# Patient Record
Sex: FEMALE | Race: WHITE | NOT HISPANIC OR LATINO | Employment: OTHER | URBAN - METROPOLITAN AREA
[De-identification: names, ages, dates, MRNs, and addresses within clinical notes are randomized per-mention and may not be internally consistent; named-entity substitution may affect disease eponyms.]

---

## 2014-01-24 LAB — HM PAP SMEAR: NORMAL

## 2017-02-23 ENCOUNTER — ALLSCRIPTS OFFICE VISIT (OUTPATIENT)
Dept: OTHER | Facility: OTHER | Age: 47
End: 2017-02-23

## 2017-03-28 ENCOUNTER — GENERIC CONVERSION - ENCOUNTER (OUTPATIENT)
Dept: OTHER | Facility: OTHER | Age: 47
End: 2017-03-28

## 2017-07-03 ENCOUNTER — ALLSCRIPTS OFFICE VISIT (OUTPATIENT)
Dept: OTHER | Facility: OTHER | Age: 47
End: 2017-07-03

## 2018-01-13 VITALS
RESPIRATION RATE: 14 BRPM | DIASTOLIC BLOOD PRESSURE: 62 MMHG | WEIGHT: 181.7 LBS | HEIGHT: 64 IN | SYSTOLIC BLOOD PRESSURE: 110 MMHG | BODY MASS INDEX: 31.02 KG/M2 | HEART RATE: 60 BPM

## 2018-01-13 VITALS
WEIGHT: 172 LBS | BODY MASS INDEX: 29.37 KG/M2 | SYSTOLIC BLOOD PRESSURE: 102 MMHG | DIASTOLIC BLOOD PRESSURE: 60 MMHG | HEIGHT: 64 IN

## 2018-01-15 NOTE — RESULT NOTES
Message   Please let her know that her vascular studies appear normal  Thanks  Verified Results  VAS SCREENING [id number] 09:52AM Sarah Hare     Test Name Result Flag Reference   VAS SCREENING (Report)     [facility] REPORT   CLINICAL:   VASCULAR SCREENING      Risk Factors   PT has a significant family history of CAD and PAD  The patient has no history   of obesity  and is a non-smoker  Risk Factors   Clinical   Right Pressure: 118/ mm Hg, Left Pressure: 116/ mm Hg  Right pulse examination shows healthy posterior tibial artery and healthy   dorsalis pedis artery  Left pulse examination shows healthy posterior tibial artery and healthy   dorsalis pedis artery  FINDINGS:      Unilateral        PSV TRV Diam (cm) EDV    Celiac Artery Expiration 113      2 2  25       Right    Impression             PSV EDV Pressure    Prox CCA                     103  28         DorsPedis                            120    Dist CCA                     87  24         Post Tibial                           128    Prox  ICA  1  No significant carotid disease  84  32         Dist  ICA                     99  47            Left     Impression             PSV EDV Pressure    Prox CCA                     98  28         DorsPedis                            118    Dist CCA                     69  24         Post Tibial                           128    Prox  ICA  1  No significant carotid disease  52  23         Dist  ICA                     89  40                  CONCLUSION:      Impression      CAROTID SCREENING:   Evaluation reveals a normal internal carotid artery on the right  Evaluation reveals a normal internal carotid artery on the left  AORTA SCREENING:   The abdominal aorta is patent and normal in caliber with the greatest diameter   measurement of 2 2 cm  PAD SCREENING:   The ankle/brachial index on the right is 1 08 , which is within normal limits     The ankle/brachial index on the left is 1 08 , which is within normal limits        SIGNATURE:   Electronically Signed by: Lydia Thorne MD on 2016-06-29 02:00:56 PM

## 2018-04-05 ENCOUNTER — OFFICE VISIT (OUTPATIENT)
Dept: FAMILY MEDICINE CLINIC | Facility: CLINIC | Age: 48
End: 2018-04-05
Payer: COMMERCIAL

## 2018-04-05 VITALS
DIASTOLIC BLOOD PRESSURE: 62 MMHG | TEMPERATURE: 98.3 F | SYSTOLIC BLOOD PRESSURE: 104 MMHG | WEIGHT: 179 LBS | BODY MASS INDEX: 30.44 KG/M2

## 2018-04-05 DIAGNOSIS — R22.31 AXILLARY LUMP, RIGHT: Primary | ICD-10-CM

## 2018-04-05 PROCEDURE — 1036F TOBACCO NON-USER: CPT | Performed by: FAMILY MEDICINE

## 2018-04-05 PROCEDURE — 99214 OFFICE O/P EST MOD 30 MIN: CPT | Performed by: FAMILY MEDICINE

## 2018-04-05 NOTE — PROGRESS NOTES
Assessment/Plan:  Axillary lump, right  The patient has a subcutaneous mass in her right axilla  Based on her history and examination I believe it is in all likelihood benign in nature such as lipoma or increased glandular tissue  It is not inflamed to suggest hidradenitis suppurativa  When she left we discussed continued observation in the likely benign nature of the condition  As I am finishing her note today I believe that an ultrasound to be completely sure of its nature is warranted and we will let her know this  Diagnoses and all orders for this visit:    Axillary lump, right  -     US extremity soft tissue; Future          Subjective:   Chief Complaint   Patient presents with    Mass     lump in armpit xs 3 weeks   Sees Elda Norton  A lump in armpit noted 3 weeks ago while travelling with Mom  Noted visually after taking a shower  Soft and non tender  Began to feel a little tender 2-3 days ago  No history of same  Patient ID: Caleb Mendez is a 52 y o  female  HPI  The patient is a 44-year-old female who presents today stating that she noted a lump in her right armpit approximately 3 weeks ago  She had no discomfort  She was taking shower in raise her arm to tail off and noticed that it seemed enlarged compared to her left side  It is soft mobile and nontender  She states the couple days ago it felt a little tender and she became more concerned no it is seems nontender presently  She has had some issues with her left axilla for which she has been seen gynecologist knee told her that they thought was some excess breast tissue and that it was not concerned  She has had no fevers chills or constitutional symptoms she has had no other lumps or bumps  She does get regular mammography through her gynecologist, Dr Sharath Bush in [facility]  Her mother had ovarian carcinoma and she follows regularly with her      The following portions of the patient's history were reviewed and updated as appropriate: allergies, current medications, past family history, past medical history, past social history, past surgical history and problem list     Review of Systems   Constitution: Positive for night sweats  Negative for chills, decreased appetite and fever  Night sweats have been felt to be caused by vasomotor symptoms from perimenopause   Cardiovascular: Negative for chest pain and leg swelling  Respiratory: Negative for cough and shortness of breath  Hematologic/Lymphatic: Positive for adenopathy  L axilla  R by history and told extra breast tissue  Skin: Negative for rash and suspicious lesions  Check spot on cheek, present for a long while without change  Gastrointestinal: Negative for constipation and diarrhea  Genitourinary: Positive for missed menses  Negative for bladder incontinence, menorrhagia and pelvic pain  Some menstrual irregularity over the past 6 months  Objective:    Physical Exam   Constitutional: She is oriented to person, place, and time  She appears well-developed and well-nourished  Overweight in no apparent distress   Neck: No JVD present  No thyromegaly present  Cardiovascular: Normal rate and regular rhythm  Pulmonary/Chest: Breath sounds normal    Musculoskeletal: She exhibits no edema  Lymphadenopathy:     She has no cervical adenopathy  Neurological: She is alert and oriented to person, place, and time  Skin:   Right axilla reveals a subcutaneous fullness of approximately 4 by 2-1/2 cm  It is soft mobile not fixed in place and no firmness or induration  I believe this most likely represents a lipoma or some excess glandular tissue  Does not feel like breast tail as it is not contiguous, does not feel like adenopathy

## 2018-04-05 NOTE — ASSESSMENT & PLAN NOTE
The patient has a subcutaneous mass in her right axilla  Based on her history and examination I believe it is in all likelihood benign in nature such as lipoma or increased glandular tissue  It is not inflamed to suggest hidradenitis suppurativa  When she left we discussed continued observation in the likely benign nature of the condition  As I am finishing her note today I believe that an ultrasound to be completely sure of its nature is warranted and we will let her know this

## 2018-04-11 ENCOUNTER — HOSPITAL ENCOUNTER (OUTPATIENT)
Dept: ULTRASOUND IMAGING | Facility: CLINIC | Age: 48
Discharge: HOME | End: 2018-04-11
Payer: COMMERCIAL

## 2018-04-11 DIAGNOSIS — R22.31 AXILLARY LUMP, RIGHT: ICD-10-CM

## 2018-04-11 PROCEDURE — 76882 US LMTD JT/FCL EVL NVASC XTR: CPT

## 2019-06-22 ENCOUNTER — TELEPHONE (OUTPATIENT)
Dept: OTHER | Facility: OTHER | Age: 49
End: 2019-06-22

## 2019-07-30 ENCOUNTER — OFFICE VISIT (OUTPATIENT)
Dept: FAMILY MEDICINE CLINIC | Facility: CLINIC | Age: 49
End: 2019-07-30
Payer: COMMERCIAL

## 2019-07-30 VITALS
DIASTOLIC BLOOD PRESSURE: 72 MMHG | BODY MASS INDEX: 30.22 KG/M2 | WEIGHT: 177 LBS | OXYGEN SATURATION: 98 % | SYSTOLIC BLOOD PRESSURE: 124 MMHG | HEIGHT: 64 IN | HEART RATE: 70 BPM | TEMPERATURE: 97.8 F

## 2019-07-30 DIAGNOSIS — L30.9 DERMATITIS: Primary | ICD-10-CM

## 2019-07-30 PROCEDURE — 1036F TOBACCO NON-USER: CPT | Performed by: FAMILY MEDICINE

## 2019-07-30 PROCEDURE — 99214 OFFICE O/P EST MOD 30 MIN: CPT | Performed by: FAMILY MEDICINE

## 2019-07-30 PROCEDURE — 3008F BODY MASS INDEX DOCD: CPT | Performed by: FAMILY MEDICINE

## 2019-07-30 RX ORDER — OXYCODONE HYDROCHLORIDE AND ACETAMINOPHEN 5; 325 MG/1; MG/1
1 TABLET ORAL EVERY 6 HOURS PRN
Refills: 0 | COMMUNITY
Start: 2019-07-02 | End: 2019-07-30 | Stop reason: ALTCHOICE

## 2019-07-30 RX ORDER — PREDNISONE 10 MG/1
10 TABLET ORAL DAILY
Qty: 22 TABLET | Refills: 0 | Status: SHIPPED | OUTPATIENT
Start: 2019-07-30 | End: 2020-02-20 | Stop reason: ALTCHOICE

## 2019-07-30 NOTE — PROGRESS NOTES
Assessment/Plan:  Axillary lump, right  US was negative    Dermatitis  The patient has an erythematous eruption which is intensely pruritic  She has had this repeatedly at this time of the year in the past   At times was felt to be rhus dermatitis but I do not believe this based on her examination presently  It may be examined this due to hypersensitivity  I do not believe it represents wound infection  We are going to treat her with prednisone taper  She is asked to call 48 hours with report of her condition  She will call sooner should she develop drainage fever or other symptoms  She is in agreement  Also going to get some routine blood work to include CBC, CMP, lipids and TSH        Diagnoses and all orders for this visit:    Dermatitis  -     predniSONE 10 mg tablet; Take 1 tablet (10 mg total) by mouth daily 4 daily for 2 days then 3 daily for 2 days then 2 daily for 2 days then 1 daily for 4 days    Other orders  -     Discontinue: oxyCODONE-acetaminophen (PERCOCET) 5-325 mg per tablet; Take 1 tablet by mouth every 6 (six) hours as needed          Subjective:   Chief Complaint   Patient presents with    Rash     trunk and very itchy  bmi f/u plan needed   Had BSO and bilateral oophorectomy  Began last week with Terrible itch  No drainage  Chills but no fever  Patient ID: Jaya Webster is a 52 y o  female  HPI  The patient is a 66-year-old female with a history of recurrent dermatitis of her lower trunk and upper thighs  In the past is typically occurred in the week surrounding her birthday which is 3 days ago  She underwent bilateral oophorectomy and salpingectomy due to endometrioma and family history of ovarian carcinoma  This occurred about 1 month ago  Last week she noted that she had a terrible it of her anterior abdomen  She noted red rash  It seemed to be around her laparoscopy ports more intensely  She has had no drainage  She did see her surgeon yesterday    She told her to follow appear since she had been treated for this here previously  She has of no abdominal pain  No nausea vomiting diarrhea X cetera  No cardiovascular pulmonary complaint  She does note some chills but no documented fever  The following portions of the patient's history were reviewed and updated as appropriate: allergies, current medications, past family history, past medical history, past social history, past surgical history and problem list     Review of Systems   Constitution: Positive for chills  Negative for decreased appetite, fever, malaise/fatigue and weight loss  Cardiovascular: Negative for chest pain, leg swelling, orthopnea and paroxysmal nocturnal dyspnea  Respiratory: Negative for cough and wheezing  Endocrine: Negative for polydipsia, polyphagia and polyuria  Hematologic/Lymphatic: Negative for adenopathy and bleeding problem  Skin: Positive for itching and rash  Negative for suspicious lesions  Gastrointestinal: Negative for constipation and diarrhea  Genitourinary: Negative for dysuria  Objective:    Physical Exam   Cardiovascular: Normal rate and regular rhythm  Pulmonary/Chest: Effort normal and breath sounds normal    Abdominal: Soft  Bowel sounds are normal  She exhibits no mass  There is no tenderness  She has no abdominal mass or tenderness  Skin: Rash noted  There is erythema  She has intensely erythematous eruption of her anterior abdomen that spreads to her lateral abdomen but not to her posterior thorax  No involvement of her proximal thighs/inguinal regions  No adenopathy noted  It is noted that the erythema surrounds her laparoscopic port wounds by approximately 5 cm circumferentially for all wounds  She also has some linear erythema noted in her lower and mid abdominal region  There is no drainage present  There is no significant induration  There is no excoriation presently  There is no primary papule or vesicle noted     Nursing note and vitals reviewed

## 2019-07-30 NOTE — ASSESSMENT & PLAN NOTE
The patient has an erythematous eruption which is intensely pruritic  She has had this repeatedly at this time of the year in the past   At times was felt to be rhus dermatitis but I do not believe this based on her examination presently  It may be examined this due to hypersensitivity  I do not believe it represents wound infection  We are going to treat her with prednisone taper  She is asked to call 48 hours with report of her condition  She will call sooner should she develop drainage fever or other symptoms  She is in agreement

## 2019-08-19 DIAGNOSIS — E66.3 OVERWEIGHT: Primary | ICD-10-CM

## 2019-08-19 DIAGNOSIS — Z13.220 SCREENING FOR LIPID DISORDERS: ICD-10-CM

## 2019-08-19 DIAGNOSIS — Z13.1 SCREENING FOR DIABETES MELLITUS: ICD-10-CM

## 2019-08-19 DIAGNOSIS — Z13.0 SCREENING FOR DEFICIENCY ANEMIA: ICD-10-CM

## 2020-02-20 ENCOUNTER — OFFICE VISIT (OUTPATIENT)
Dept: FAMILY MEDICINE CLINIC | Facility: CLINIC | Age: 50
End: 2020-02-20
Payer: COMMERCIAL

## 2020-02-20 VITALS
DIASTOLIC BLOOD PRESSURE: 80 MMHG | SYSTOLIC BLOOD PRESSURE: 118 MMHG | HEIGHT: 64 IN | BODY MASS INDEX: 29.88 KG/M2 | WEIGHT: 175 LBS

## 2020-02-20 DIAGNOSIS — L30.9 PERIANAL DERMATITIS: Primary | ICD-10-CM

## 2020-02-20 PROCEDURE — 1036F TOBACCO NON-USER: CPT | Performed by: FAMILY MEDICINE

## 2020-02-20 PROCEDURE — 99213 OFFICE O/P EST LOW 20 MIN: CPT | Performed by: FAMILY MEDICINE

## 2020-02-20 PROCEDURE — 3008F BODY MASS INDEX DOCD: CPT | Performed by: FAMILY MEDICINE

## 2020-02-20 NOTE — PROGRESS NOTES
BMI Counseling: Body mass index is 29 76 kg/m²  The BMI is above normal  Nutrition recommendations include decreasing portion sizes, encouraging healthy choices of fruits and vegetables, consuming healthier snacks and moderation in carbohydrate intake  Exercise recommendations include moderate physical activity 150 minutes/week and exercising 3-5 times per week  No pharmacotherapy was ordered  Assessment/Plan:  Perianal dermatitis  Patient has been symptomatic with anal itching for 3 months  She does work at a   While there is no definitive evidence of enterobius vermicullaris infection I do believe that treatment is reasonable  We are going to give her Vermox 100 mg stat and repeat in 2 weeks  Additionally we asked her to use OTC Lotrimin cream though again I doubt that this represents Ericka  She is asked to call in 2-3 weeks if her symptoms have not completely resolved  She is in agreement with this plan  Diagnoses and all orders for this visit:    Perianal dermatitis  -     mebendazole (VERMOX) 100 MG chewable tablet; Chew 1 tablet (100 mg total) once for 1 dose Repeat in 2 weeks          Subjective:   Chief Complaint   Patient presents with    anal itching        Patient ID: Parker Wisdom is a 52 y o  female  I have anal itching again  Had pinworms by history  Developed anal itching around November  Was working in a  at that time  Consistent since that time  Questions whether it could be Candida  No bleeding  No hemorrhoidal tissues  Did not try tape test  No change in [facility]  The patient is a 20-year-old female presents today stating that she has had anal itching for the past 3 months or so  Around the time of began she was working more hours in a  though she is not aware of any definitive diagnosis of pinworms  She states that she has had consistent itching since that time  She also questions whether it could be Candida albicans  She has had no bleeding  She has had no hemorrhoidal issues  She has had no change in her bowel habits  She did not try the tape test and does not feel that this is something that she can do  She has had no fevers chills or constitutional symptoms  She does have a history of positive pinworm infection in the past   No one else at home is symptomatic  The following portions of the patient's history were reviewed and updated as appropriate: allergies, current medications, past family history, past medical history, past social history, past surgical history and problem list     ROS    Limited pertinent review of systems is per the HPI  Objective:    Physical Exam   Constitutional: She is oriented to person, place, and time  She appears well-developed  Overweight and in no distress   Genitourinary:   Genitourinary Comments: The patient has anal region was examined with a chaperone present  There is no evidence of fissure  No hemorrhoidal tissue  No erythema or induration noted  No satellite lesions  there were some whitish hair like foreign bodies  These were obtained with a Q-tip and examined under magnification and appeared to represent fabric or tissue residue  Neurological: She is alert and oriented to person, place, and time  Skin: No rash noted  Psychiatric: She has a normal mood and affect  Thought content normal    Nursing note and vitals reviewed

## 2020-02-20 NOTE — ASSESSMENT & PLAN NOTE
Patient has been symptomatic with anal itching for 3 months  She does work at a   While there is no definitive evidence of enterobius vermicullaris infection I do believe that treatment is reasonable  We are going to give her Vermox 100 mg stat and repeat in 2 weeks  Additionally we asked her to use OTC Lotrimin cream though again I doubt that this represents Ericka  She is asked to call in 2-3 weeks if her symptoms have not completely resolved  She is in agreement with this plan

## 2020-05-09 ENCOUNTER — NURSE TRIAGE (OUTPATIENT)
Dept: OTHER | Facility: OTHER | Age: 50
End: 2020-05-09

## 2020-05-09 ENCOUNTER — AMB VIDEO VISIT (OUTPATIENT)
Dept: URGENT CARE | Facility: CLINIC | Age: 50
End: 2020-05-09

## 2020-05-09 DIAGNOSIS — L23.7 POISON IVY DERMATITIS: Primary | ICD-10-CM

## 2020-05-09 RX ORDER — METHYLPREDNISOLONE 4 MG/1
TABLET ORAL
Qty: 21 TABLET | Refills: 0 | Status: SHIPPED | OUTPATIENT
Start: 2020-05-09 | End: 2020-05-27

## 2020-05-27 ENCOUNTER — OFFICE VISIT (OUTPATIENT)
Dept: FAMILY MEDICINE CLINIC | Facility: CLINIC | Age: 50
End: 2020-05-27
Payer: COMMERCIAL

## 2020-05-27 VITALS
HEART RATE: 78 BPM | HEIGHT: 64 IN | RESPIRATION RATE: 18 BRPM | SYSTOLIC BLOOD PRESSURE: 110 MMHG | BODY MASS INDEX: 29.76 KG/M2 | DIASTOLIC BLOOD PRESSURE: 60 MMHG | OXYGEN SATURATION: 98 %

## 2020-05-27 DIAGNOSIS — L29.0 PERIANAL PRURITUS: Primary | ICD-10-CM

## 2020-05-27 DIAGNOSIS — N95.1 MENOPAUSAL SYNDROME (HOT FLASHES): ICD-10-CM

## 2020-05-27 DIAGNOSIS — Z13.29 SCREENING FOR THYROID DISORDER: ICD-10-CM

## 2020-05-27 DIAGNOSIS — Z12.39 SCREENING FOR BREAST CANCER: ICD-10-CM

## 2020-05-27 DIAGNOSIS — Z13.1 SCREENING FOR DIABETES MELLITUS: ICD-10-CM

## 2020-05-27 DIAGNOSIS — Z13.0 SCREENING, ANEMIA, DEFICIENCY, IRON: ICD-10-CM

## 2020-05-27 DIAGNOSIS — Z13.220 SCREENING FOR LIPID DISORDERS: ICD-10-CM
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
## 2020-05-27 PROBLEM — L30.9 DERMATITIS: Status: RESOLVED | Noted: 2019-07-30 | Resolved: 2020-05-27

## 2020-05-27 PROBLEM — R22.31 AXILLARY LUMP, RIGHT: Status: RESOLVED | Noted: 2018-04-05 | Resolved: 2020-05-27

## 2020-05-27 PROCEDURE — 1036F TOBACCO NON-USER: CPT | Performed by: FAMILY MEDICINE

## 2020-05-27 PROCEDURE — 99204 OFFICE O/P NEW MOD 45 MIN: CPT | Performed by: FAMILY MEDICINE

## 2020-05-27 RX ORDER — DOXYCYCLINE HYCLATE 100 MG/1
100 CAPSULE ORAL EVERY 12 HOURS SCHEDULED
Qty: 2 CAPSULE | Refills: 0 | Status: SHIPPED | OUTPATIENT
Start: 2020-05-27 | End: 2020-05-28

## 2020-06-02 ENCOUNTER — OFFICE VISIT (OUTPATIENT)
Dept: FAMILY MEDICINE CLINIC | Facility: CLINIC | Age: 50
End: 2020-06-02
Payer: COMMERCIAL

## 2020-06-02 VITALS
HEART RATE: 80 BPM | WEIGHT: 158 LBS | HEIGHT: 64 IN | OXYGEN SATURATION: 97 % | SYSTOLIC BLOOD PRESSURE: 96 MMHG | BODY MASS INDEX: 26.98 KG/M2 | DIASTOLIC BLOOD PRESSURE: 64 MMHG | TEMPERATURE: 97.1 F

## 2020-06-02 DIAGNOSIS — L82.1 SEBORRHEIC KERATOSES: Primary | ICD-10-CM

## 2020-06-02 DIAGNOSIS — D22.9 NUMEROUS MOLES: ICD-10-CM

## 2020-06-02 PROCEDURE — 99213 OFFICE O/P EST LOW 20 MIN: CPT | Performed by: FAMILY MEDICINE

## 2020-06-02 PROCEDURE — 1036F TOBACCO NON-USER: CPT | Performed by: FAMILY MEDICINE

## 2020-06-02 PROCEDURE — 3008F BODY MASS INDEX DOCD: CPT | Performed by: FAMILY MEDICINE

## 2020-06-18 ENCOUNTER — AMB VIDEO VISIT (OUTPATIENT)
Dept: URGENT CARE | Facility: CLINIC | Age: 50
End: 2020-06-18

## 2020-06-18 ENCOUNTER — AMB VIDEO VISIT (OUTPATIENT)
Dept: OTHER | Facility: HOSPITAL | Age: 50
End: 2020-06-18
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
## 2020-06-18 DIAGNOSIS — S30.861A INSECT BITE (NONVENOMOUS) OF ABDOMINAL WALL, INITIAL ENCOUNTER: ICD-10-CM

## 2020-06-18 DIAGNOSIS — R21 RASH: Primary | ICD-10-CM

## 2020-06-18 PROCEDURE — 99499 UNLISTED E&M SERVICE: CPT | Performed by: FAMILY MEDICINE

## 2020-06-18 PROCEDURE — EVISIT: Performed by: FAMILY MEDICINE

## 2020-06-18 RX ORDER — DOXYCYCLINE 100 MG/1
100 CAPSULE ORAL 2 TIMES DAILY
Qty: 14 CAPSULE | Refills: 0 | Status: SHIPPED | OUTPATIENT
Start: 2020-06-18 | End: 2020-06-25

## 2020-06-19 ENCOUNTER — TELEMEDICINE (OUTPATIENT)
Dept: FAMILY MEDICINE CLINIC | Facility: CLINIC | Age: 50
End: 2020-06-19
Payer: COMMERCIAL

## 2020-06-19 VITALS — BODY MASS INDEX: 26.46 KG/M2 | TEMPERATURE: 101 F | WEIGHT: 155 LBS | HEIGHT: 64 IN

## 2020-06-19 DIAGNOSIS — A69.20 LYME DISEASE: Primary | ICD-10-CM

## 2020-06-19 PROCEDURE — 99214 OFFICE O/P EST MOD 30 MIN: CPT | Performed by: FAMILY MEDICINE

## 2020-06-22 ENCOUNTER — OFFICE VISIT (OUTPATIENT)
Dept: FAMILY MEDICINE CLINIC | Facility: CLINIC | Age: 50
End: 2020-06-22
Payer: COMMERCIAL

## 2020-06-22 VITALS
WEIGHT: 153.5 LBS | OXYGEN SATURATION: 98 % | HEIGHT: 64 IN | BODY MASS INDEX: 26.21 KG/M2 | SYSTOLIC BLOOD PRESSURE: 130 MMHG | DIASTOLIC BLOOD PRESSURE: 78 MMHG | TEMPERATURE: 97.4 F | HEART RATE: 67 BPM

## 2020-06-22 DIAGNOSIS — L29.0 PERIANAL PRURITUS: ICD-10-CM

## 2020-06-22 DIAGNOSIS — T36.95XA ANTIBIOTIC-INDUCED YEAST INFECTION: ICD-10-CM

## 2020-06-22 DIAGNOSIS — B37.9 ANTIBIOTIC-INDUCED YEAST INFECTION: ICD-10-CM

## 2020-06-22 DIAGNOSIS — A69.20 ERYTHEMA MIGRANS (LYME DISEASE): ICD-10-CM

## 2020-06-22 DIAGNOSIS — A69.20 LYME DISEASE: Primary | ICD-10-CM

## 2020-06-22 DIAGNOSIS — N95.1 MENOPAUSAL SYNDROME (HOT FLASHES): ICD-10-CM

## 2020-06-22 PROCEDURE — 3008F BODY MASS INDEX DOCD: CPT | Performed by: FAMILY MEDICINE

## 2020-06-22 PROCEDURE — 99214 OFFICE O/P EST MOD 30 MIN: CPT | Performed by: FAMILY MEDICINE

## 2020-06-22 PROCEDURE — 1036F TOBACCO NON-USER: CPT | Performed by: FAMILY MEDICINE

## 2020-06-22 RX ORDER — DOXYCYCLINE HYCLATE 100 MG/1
100 CAPSULE ORAL EVERY 12 HOURS SCHEDULED
Qty: 20 CAPSULE | Refills: 0 | Status: SHIPPED | OUTPATIENT
Start: 2020-06-22 | End: 2020-07-03

## 2020-06-22 RX ORDER — FLUCONAZOLE 150 MG/1
150 TABLET ORAL WEEKLY
Qty: 4 TABLET | Refills: 1 | Status: SHIPPED | OUTPATIENT
Start: 2020-06-22 | End: 2020-07-14 | Stop reason: SDUPTHER

## 2020-07-14 DIAGNOSIS — B37.9 ANTIBIOTIC-INDUCED YEAST INFECTION: ICD-10-CM

## 2020-07-14 DIAGNOSIS — T36.95XA ANTIBIOTIC-INDUCED YEAST INFECTION: ICD-10-CM

## 2020-07-14 RX ORDER — FLUCONAZOLE 150 MG/1
150 TABLET ORAL WEEKLY
Qty: 4 TABLET | Refills: 1 | Status: SHIPPED | OUTPATIENT
Start: 2020-07-14 | End: 2020-08-11

## 2020-09-15 ENCOUNTER — TELEPHONE (OUTPATIENT)
Dept: DERMATOLOGY | Facility: CLINIC | Age: 50
End: 2020-09-15

## 2020-09-15 ENCOUNTER — TELEPHONE (OUTPATIENT)
Dept: FAMILY MEDICINE CLINIC | Facility: CLINIC | Age: 50
End: 2020-09-15

## 2020-09-15 DIAGNOSIS — Z12.11 SCREEN FOR COLON CANCER: ICD-10-CM

## 2020-09-15 DIAGNOSIS — Z71.3 NUTRITIONAL COUNSELING: Primary | ICD-10-CM

## 2020-09-15 NOTE — TELEPHONE ENCOUNTER
[id number]    Pt needs a order so she can have the coloscopy done  Can you put in the order  Call when done

## 2020-09-15 NOTE — TELEPHONE ENCOUNTER
Patient called inquiring about new patient appointment  I returned patient call and explained the wait list   I asked patient to call back if she'd like to be scheduled

## 2020-09-17 ENCOUNTER — TELEPHONE (OUTPATIENT)
Dept: GASTROENTEROLOGY | Facility: AMBULARY SURGERY CENTER | Age: 50
End: 2020-09-17

## 2020-09-17 ENCOUNTER — PREP FOR PROCEDURE (OUTPATIENT)
Dept: GASTROENTEROLOGY | Facility: AMBULARY SURGERY CENTER | Age: 50
End: 2020-09-17

## 2020-09-17 DIAGNOSIS — Z12.11 SCREENING FOR COLON CANCER: Primary | ICD-10-CM

## 2020-09-17 DIAGNOSIS — Z12.11 COLON CANCER SCREENING: Primary | ICD-10-CM

## 2020-09-17 NOTE — TELEPHONE ENCOUNTER
Called pt, scheduled colonoscopy on 9/26 at [address] [place] [address] with Dr Montserrat Osborn  Reviewed prep instructions, reminded needs a  and will get a call the day before with the arrival time  Mailed and emailed prep instructions to pt  Provider:  Please send Suprep to pt's [facility] pharmacy  Thank you!

## 2020-09-17 NOTE — TELEPHONE ENCOUNTER
09/16/20  Screened by: Shirlean Gitelman     Referring Provider Dr Danny Simeon: If patient answers NO to medical questions, then schedule procedure  If patient answers YES to medical questions, then schedule office appointment      Previous Colonoscopy yes   Date and Facility of last colonoscopy?      Comments: patient passed OA and would like to be scheduled in [facility]  Patient can be reached at [phone]           Pre- Screening: Body mass index is 26 1 kg/m²  Has patient been referred for a routine screening Colonoscopy? yes  Is the patient between 39-70 years old? yes     SCHEDULING STAFF  · If the patient is between 45yrs-49yrs, please advise patient to confirm benefits/coverage with their insurance company for a routine screening colonoscopy, some insurance carriers will only cover at [phone] or older  · If the patient is over 76years old, please schedule an office visit  · If the patient had a previous colonoscopy send to the procedure  before continuing     Have you been diagnosed with a bleeding disorder or anemia? no     Do you take no     Have you been diagnosed with Diabetes or are you taking any   Diabetic medications? no     Do you have any of the following symptoms? no     Have you had a coronary or vascular stent within the last year? no     Have you had a heart attack or stroke in the last 6 months? no     Have you had intestinal surgery in the last 3 months? no     Do you have problems with: no     Do you use: no     Have you been hospitalized in the last Month? no     Have you had chest pain (angina) or breathing problems  (COPD) in the last 3 months? no     Do you have any difficulty walking up a flight of stairs? no     Have you had Kidney failure or insufficiency? no     Have you had heart valve surgery? no     Are you confined to a wheelchair?  no

## 2020-09-24 DIAGNOSIS — Z13.0 SCREENING, ANEMIA, DEFICIENCY, IRON: Primary | ICD-10-CM

## 2020-09-24 DIAGNOSIS — Z13.29 SCREENING FOR THYROID DISORDER: ICD-10-CM

## 2020-09-24 DIAGNOSIS — Z13.220 SCREENING FOR LIPID DISORDERS: ICD-10-CM

## 2020-09-24 DIAGNOSIS — Z13.1 SCREENING FOR DIABETES MELLITUS: ICD-10-CM

## 2020-09-25 LAB
ALBUMIN SERPL-MCNC: 4.5 G/DL (ref 3.6–5.1)
ALBUMIN/GLOB SERPL: 2 (CALC) (ref 1–2.5)
ALP SERPL-CCNC: 82 U/L (ref 37–153)
ALT SERPL-CCNC: 13 U/L (ref 6–29)
AST SERPL-CCNC: 16 U/L (ref 10–35)
BASOPHILS # BLD AUTO: 60 CELLS/UL (ref 0–200)
BASOPHILS NFR BLD AUTO: 1 %
BILIRUB SERPL-MCNC: 0.6 MG/DL (ref 0.2–1.2)
BUN SERPL-MCNC: 15 MG/DL (ref 7–25)
BUN/CREAT SERPL: NORMAL (CALC) (ref 6–22)
CALCIUM SERPL-MCNC: 9.8 MG/DL (ref 8.6–10.4)
CHLORIDE SERPL-SCNC: 102 MMOL/L (ref 98–110)
CHOLEST SERPL-MCNC: 224 MG/DL
CHOLEST/HDLC SERPL: 3.7 (CALC)
CO2 SERPL-SCNC: 31 MMOL/L (ref 20–32)
CREAT SERPL-MCNC: 0.88 MG/DL (ref 0.5–1.05)
EOSINOPHIL # BLD AUTO: 150 CELLS/UL (ref 15–500)
EOSINOPHIL NFR BLD AUTO: 2.5 %
ERYTHROCYTE [DISTWIDTH] IN BLOOD BY AUTOMATED COUNT: 12.6 % (ref 11–15)
GLOBULIN SER CALC-MCNC: 2.3 G/DL (CALC) (ref 1.9–3.7)
GLUCOSE SERPL-MCNC: 95 MG/DL (ref 65–99)
HCT VFR BLD AUTO: 40.6 % (ref 35–45)
HDLC SERPL-MCNC: 60 MG/DL
HGB BLD-MCNC: 13.3 G/DL (ref 11.7–15.5)
LDLC SERPL CALC-MCNC: 136 MG/DL (CALC)
LYMPHOCYTES # BLD AUTO: 1938 CELLS/UL (ref 850–3900)
LYMPHOCYTES NFR BLD AUTO: 32.3 %
MCH RBC QN AUTO: 28.9 PG (ref 27–33)
MCHC RBC AUTO-ENTMCNC: 32.8 G/DL (ref 32–36)
MCV RBC AUTO: 88.3 FL (ref 80–100)
MONOCYTES # BLD AUTO: 450 CELLS/UL (ref 200–950)
MONOCYTES NFR BLD AUTO: 7.5 %
NEUTROPHILS # BLD AUTO: 3402 CELLS/UL (ref 1500–7800)
NEUTROPHILS NFR BLD AUTO: 56.7 %
NONHDLC SERPL-MCNC: 164 MG/DL (CALC)
PLATELET # BLD AUTO: 283 THOUSAND/UL (ref 140–400)
PMV BLD REES-ECKER: 10 FL (ref 7.5–12.5)
POTASSIUM SERPL-SCNC: 4 MMOL/L (ref 3.5–5.3)
PROT SERPL-MCNC: 6.8 G/DL (ref 6.1–8.1)
RBC # BLD AUTO: 4.6 MILLION/UL (ref 3.8–5.1)
SL AMB EGFR AFRICAN AMERICAN: 89 ML/MIN/1.73M2
SL AMB EGFR NON AFRICAN AMERICAN: 77 ML/MIN/1.73M2
SODIUM SERPL-SCNC: 140 MMOL/L (ref 135–146)
TRIGL SERPL-MCNC: 152 MG/DL
TSH SERPL-ACNC: 2.45 MIU/L
WBC # BLD AUTO: 6 THOUSAND/UL (ref 3.8–10.8)

## 2020-09-26 ENCOUNTER — ANESTHESIA EVENT (OUTPATIENT)
Dept: GASTROENTEROLOGY | Facility: HOSPITAL | Age: 50
End: 2020-09-26

## 2020-09-26 ENCOUNTER — ANESTHESIA (OUTPATIENT)
Dept: GASTROENTEROLOGY | Facility: HOSPITAL | Age: 50
End: 2020-09-26

## 2020-09-26 ENCOUNTER — HOSPITAL ENCOUNTER (OUTPATIENT)
Dept: GASTROENTEROLOGY | Facility: HOSPITAL | Age: 50
Setting detail: OUTPATIENT SURGERY
Discharge: HOME | End: 2020-09-26
Attending: INTERNAL MEDICINE | Admitting: INTERNAL MEDICINE
Payer: COMMERCIAL

## 2020-09-26 VITALS
RESPIRATION RATE: 20 BRPM | TEMPERATURE: 97 F | HEIGHT: 64 IN | DIASTOLIC BLOOD PRESSURE: 56 MMHG | SYSTOLIC BLOOD PRESSURE: 103 MMHG | WEIGHT: 148 LBS | OXYGEN SATURATION: 99 % | BODY MASS INDEX: 25.27 KG/M2 | HEART RATE: 62 BPM

## 2020-09-26 VITALS — HEART RATE: 62 BPM

## 2020-09-26 DIAGNOSIS — Z12.11 SCREENING FOR COLON CANCER: ICD-10-CM

## 2020-09-26 PROCEDURE — G0121 COLON CA SCRN NOT HI RSK IND: HCPCS | Performed by: INTERNAL MEDICINE

## 2020-09-26 RX ORDER — SACCHAROMYCES BOULARDII 250 MG
250 CAPSULE ORAL 2 TIMES DAILY
COMMUNITY
End: 2021-03-08 | Stop reason: ALTCHOICE

## 2020-09-26 RX ORDER — PROPOFOL 10 MG/ML
INJECTION, EMULSION INTRAVENOUS AS NEEDED
Status: DISCONTINUED | OUTPATIENT
Start: 2020-09-26 | End: 2020-09-26

## 2020-09-26 RX ORDER — DIPHENOXYLATE HYDROCHLORIDE AND ATROPINE SULFATE 2.5; .025 MG/1; MG/1
1 TABLET ORAL DAILY
COMMUNITY

## 2020-09-26 RX ORDER — SODIUM CHLORIDE, SODIUM LACTATE, POTASSIUM CHLORIDE, CALCIUM CHLORIDE 600; 310; 30; 20 MG/100ML; MG/100ML; MG/100ML; MG/100ML
INJECTION, SOLUTION INTRAVENOUS CONTINUOUS PRN
Status: DISCONTINUED | OUTPATIENT
Start: 2020-09-26 | End: 2020-09-26

## 2020-09-26 RX ORDER — PROPOFOL 10 MG/ML
INJECTION, EMULSION INTRAVENOUS CONTINUOUS PRN
Status: DISCONTINUED | OUTPATIENT
Start: 2020-09-26 | End: 2020-09-26

## 2020-09-26 RX ADMIN — SODIUM CHLORIDE, SODIUM LACTATE, POTASSIUM CHLORIDE, AND CALCIUM CHLORIDE: .6; .31; .03; .02 INJECTION, SOLUTION INTRAVENOUS at 11:52

## 2020-09-26 RX ADMIN — PROPOFOL 80 MG: 10 INJECTION, EMULSION INTRAVENOUS at 12:03

## 2020-09-26 RX ADMIN — PROPOFOL 100 MCG/KG/MIN: 10 INJECTION, EMULSION INTRAVENOUS at 12:03

## 2020-09-26 NOTE — ANESTHESIA PREPROCEDURE EVALUATION
Procedure:  COLONOSCOPY    Relevant Problems   No relevant active problems        Physical Exam    Airway    Mallampati score: II  TM Distance: >3 FB  Neck ROM: full     Dental   No notable dental hx     Cardiovascular  Cardiovascular exam normal    Pulmonary  Pulmonary exam normal     Other Findings        Anesthesia Plan  ASA Score- 1     Anesthesia Type- IV sedation with anesthesia with ASA Monitors  Additional Monitors:   Airway Plan:           Plan Factors-Exercise tolerance (METS): >4 METS  Chart reviewed  Imaging results reviewed  Existing labs reviewed  Patient is not a current smoker  Induction-     Postoperative Plan-     Informed Consent- Anesthetic plan and risks discussed with patient

## 2020-10-02 ENCOUNTER — TELEPHONE (OUTPATIENT)
Dept: FAMILY MEDICINE CLINIC | Facility: CLINIC | Age: 50
End: 2020-10-02

## 2021-01-21 ENCOUNTER — HOSPITAL ENCOUNTER (EMERGENCY)
Facility: HOSPITAL | Age: 51
Discharge: HOME | End: 2021-01-21
Attending: EMERGENCY MEDICINE
Payer: COMMERCIAL

## 2021-01-21 ENCOUNTER — APPOINTMENT (EMERGENCY)
Dept: RADIOLOGY | Facility: HOSPITAL | Age: 51
End: 2021-01-21
Payer: COMMERCIAL

## 2021-01-21 VITALS
DIASTOLIC BLOOD PRESSURE: 63 MMHG | OXYGEN SATURATION: 96 % | SYSTOLIC BLOOD PRESSURE: 138 MMHG | TEMPERATURE: 97.9 F | HEART RATE: 68 BPM | RESPIRATION RATE: 16 BRPM

## 2021-01-21 DIAGNOSIS — T14.8XXA PUNCTURE WOUND: Primary | ICD-10-CM

## 2021-01-21 DIAGNOSIS — M25.579 ANKLE PAIN: ICD-10-CM

## 2021-01-21 PROCEDURE — 90715 TDAP VACCINE 7 YRS/> IM: CPT

## 2021-01-21 PROCEDURE — 99283 EMERGENCY DEPT VISIT LOW MDM: CPT

## 2021-01-21 PROCEDURE — 90471 IMMUNIZATION ADMIN: CPT

## 2021-01-21 PROCEDURE — 73600 X-RAY EXAM OF ANKLE: CPT

## 2021-01-21 PROCEDURE — 99284 EMERGENCY DEPT VISIT MOD MDM: CPT | Performed by: EMERGENCY MEDICINE

## 2021-01-21 RX ORDER — AMOXICILLIN AND CLAVULANATE POTASSIUM 875; 125 MG/1; MG/1
1 TABLET, FILM COATED ORAL ONCE
Status: COMPLETED | OUTPATIENT
Start: 2021-01-21 | End: 2021-01-21

## 2021-01-21 RX ORDER — AMOXICILLIN AND CLAVULANATE POTASSIUM 875; 125 MG/1; MG/1
1 TABLET, FILM COATED ORAL EVERY 12 HOURS SCHEDULED
Qty: 14 TABLET | Refills: 0 | Status: SHIPPED | OUTPATIENT
Start: 2021-01-21 | End: 2021-01-28

## 2021-01-21 RX ADMIN — AMOXICILLIN AND CLAVULANATE POTASSIUM 1 TABLET: 875; 125 TABLET, FILM COATED ORAL at 02:36

## 2021-01-21 RX ADMIN — TETANUS TOXOID, REDUCED DIPHTHERIA TOXOID AND ACELLULAR PERTUSSIS VACCINE, ADSORBED 0.5 ML: 5; 2.5; 8; 8; 2.5 SUSPENSION INTRAMUSCULAR at 02:09

## 2021-01-21 NOTE — DISCHARGE INSTRUCTIONS
Please follow-up with orthopedic surgery for further care, if symptoms worsen please return to  the emergency department

## 2021-01-21 NOTE — ED PROVIDER NOTES
History  Chief Complaint   Patient presents with    Puncture Wound     Patient was taking out trash, metal object was pushed into her right medial ankle  51-year-old previously healthy female presents for evaluation of right medial ankle injury  Patient states that she was taking the trash out when she she felt pain in her right medial ankle she states that she noticed a sharp genesis metal object had punctured the medial aspect of her right ankle, she states that the object when in approximately 1 cm but she was able to remove it and clean the area  She is unsure of her last tetanus shot  Otherwise no history of immunocompromise, she was able to walk without any pain, no swelling to the area, no active bleeding  No medications taken prior to arrival          Prior to Admission Medications   Prescriptions Last Dose Informant Patient Reported? Taking?   calcium-vitamin D (OSCAL) 250-125 MG-UNIT per tablet   Yes Yes   Sig: Take 1 tablet by mouth daily   multivitamin (THERAGRAN) TABS   Yes Yes   Sig: Take 1 tablet by mouth daily   saccharomyces boulardii (FLORASTOR) 250 mg capsule   Yes Yes   Sig: Take 250 mg by mouth 2 (two) times a day      Facility-Administered Medications: None       History reviewed  No pertinent past medical history  Past Surgical History:   Procedure Laterality Date    ENDOMETRIAL ABLATION  2001    HYSTERECTOMY      LAPAROSCOPIC OVARIAN CYSTECTOMY Left 2001    LIPOMA RESECTION Left 8661    UMBILICAL HERNIA REPAIR  2003    WISDOM TOOTH EXTRACTION  1988       Family History   Problem Relation Age of Onset    Ovarian cancer Mother     Glaucoma Mother     Coronary artery disease Father     Diabetes Father     Diabetes Brother     Diabetes Brother     Cirrhosis Brother     Alcohol abuse Brother     Colon cancer Neg Hx     Rectal cancer Neg Hx     Colon polyps Neg Hx      I have reviewed and agree with the history as documented      E-Cigarette/Vaping    E-Cigarette Use Never User      E-Cigarette/Vaping Substances     Social History     Tobacco Use    Smoking status: Never Smoker    Smokeless tobacco: Never Used   Substance Use Topics    Alcohol use: No    Drug use: Never       Review of Systems   Constitutional: Negative for chills and fever  HENT: Negative for rhinorrhea and sore throat  Respiratory: Negative for cough  Cardiovascular: Negative for chest pain and palpitations  Gastrointestinal: Negative for abdominal pain, nausea and vomiting  Genitourinary: Negative for dysuria, frequency and urgency  Skin: Positive for wound  Neurological: Negative for weakness, light-headedness and headaches  Physical Exam  Physical Exam  Vitals signs and nursing note reviewed  Constitutional:       Appearance: She is well-developed  HENT:      Head: Normocephalic and atraumatic  Eyes:      Pupils: Pupils are equal, round, and reactive to light  Neck:      Musculoskeletal: Normal range of motion and neck supple  Cardiovascular:      Rate and Rhythm: Normal rate and regular rhythm  Heart sounds: No murmur  No friction rub  No gallop  Pulmonary:      Effort: Pulmonary effort is normal       Breath sounds: No wheezing or rales  Chest:      Chest wall: No tenderness  Abdominal:      General: There is no distension  Palpations: Abdomen is soft  There is no mass  Tenderness: There is no guarding or rebound  Musculoskeletal:      Comments: Puncture wound as punctured to the medial aspect of the right ankle, there is no overlying erythema, no discharge, no active bleeding, distally extremity neurovascularly intact  The area was cleansed extensively with saline, flushed with 18 gauge catheter, on ultrasound evaluation does not appear to be any fluid in the joint space  Skin:     General: Skin is warm and dry  Neurological:      Mental Status: She is alert and oriented to person, place, and time               Vital Signs  ED Triage Vitals [01/21/21 0158]   Temperature Pulse Respirations Blood Pressure SpO2   97 9 °F (36 6 °C) 68 16 138/63 96 %      Temp Source Heart Rate Source Patient Position - Orthostatic VS BP Location FiO2 (%)   Tympanic Monitor Sitting Left arm --      Pain Score       --           Vitals:    01/21/21 0158   BP: 138/63   Pulse: 68   Patient Position - Orthostatic VS: Sitting         Visual Acuity      ED Medications  Medications   tetanus-diphtheria-acellular pertussis (BOOSTRIX) IM injection 0 5 mL (0 5 mL Intramuscular Given 1/21/21 0209)   amoxicillin-clavulanate (AUGMENTIN) 875-125 mg per tablet 1 tablet (1 tablet Oral Given 1/21/21 0236)       Diagnostic Studies  Results Reviewed     None                 XR ankle 2 views RIGHT   ED Interpretation by Tedra Canavan, MD (01/21 0259)   No foreign body, no fx                   Procedures  Procedures         ED Course                                           MDM  Number of Diagnoses or Management Options  Diagnosis management comments: 45-year-old female presents for evaluation status post puncture wound with genesis metal object, does not appear to be any obvious joint involvement, she is asymptomatic currently will obtain x-ray, update tetanus, will start on antibiotics prophylactically and encourage follow-up with the Orthopedic surgery for further evaluation  Disposition  Final diagnoses:   Puncture wound   Ankle pain     Time reflects when diagnosis was documented in both MDM as applicable and the Disposition within this note     Time User Action Codes Description Comment    1/21/2021  2:26 AM Vivien Richardson Edyth Tio  8XXA] Puncture wound     1/21/2021  2:27 AM Vivien Richardson [M25 [id number]] Ankle pain       ED Disposition     ED Disposition Condition Date/Time Comment    Discharge Stable u Jan 21, 2021  2:58 AM Danuta Ro discharge to home/self care              Follow-up Information     Follow up With Specialties Details Why Contact Info Additional Information    Anaya Emerson Missy Bolton MD Family Medicine Schedule an appointment as soon as possible for a visit   [address], [address] Emergency Department Emergency Medicine  If symptoms worsen [address] [place],[phone]  [address] [facility] [place] Emergency Department, [address] [facility] 10    [address] [facility] [place] Schedule an appointment as soon as possible for a visit in 1 week  [facility] [place] [phone]  [address] [place] [address] [facility] [place], [address] [place] [address], [facility] 310          Patient's Medications   Discharge Prescriptions    AMOXICILLIN-CLAVULANATE (AUGMENTIN) 875-125 MG PER TABLET    Take 1 tablet by mouth every 12 (twelve) hours for 7 days       Start Date: 1/21/2021 End Date: 1/28/2021       Order Dose: 1 tablet       Quantity: 14 tablet    Refills: 0     No discharge procedures on file      PDMP Review     None          ED Provider  Electronically Signed by           Shagyg Mcneil MD  01/21/21 6959

## 2021-01-22 ENCOUNTER — TELEMEDICINE (OUTPATIENT)
Dept: FAMILY MEDICINE CLINIC | Facility: CLINIC | Age: 51
End: 2021-01-22
Payer: COMMERCIAL

## 2021-01-22 ENCOUNTER — VBI (OUTPATIENT)
Dept: ADMINISTRATIVE | Facility: OTHER | Age: 51
End: 2021-01-22

## 2021-01-22 VITALS — HEIGHT: 64 IN | WEIGHT: 160 LBS | BODY MASS INDEX: 27.31 KG/M2

## 2021-01-22 DIAGNOSIS — S81.831A: Primary | ICD-10-CM

## 2021-01-22 PROCEDURE — 3008F BODY MASS INDEX DOCD: CPT | Performed by: FAMILY MEDICINE

## 2021-01-22 PROCEDURE — 99213 OFFICE O/P EST LOW 20 MIN: CPT | Performed by: FAMILY MEDICINE

## 2021-01-22 PROCEDURE — 3725F SCREEN DEPRESSION PERFORMED: CPT | Performed by: FAMILY MEDICINE

## 2021-01-22 PROCEDURE — 1036F TOBACCO NON-USER: CPT | Performed by: FAMILY MEDICINE

## 2021-01-22 NOTE — PROGRESS NOTES
Virtual Regular Visit      Assessment/Plan:    Problem List Items Addressed This Visit     None      Visit Diagnoses     Puncture wound of right lower extremity, initial encounter    -  Primary        It sounds as if the patient is healing well  She will complete her antibiotics  Her tetanus was updated  If by early next week things are worsening or changing she will let us know  BMI Counseling: Body mass index is 27 46 kg/m²  The BMI is above normal  Exercise recommendations include exercising 3-5 times per week  Reason for visit is   Chief Complaint   Patient presents with    Follow-up     Pt stepped on a genesis skewer while she was taking the garbage out last night  She has a puncture wound to her right medial ankle behind the ankle bone  ER gave her an antibiotic to take  Today, it is swollen and pretty painful  Pt due for mammo and is aware  Due for annual exam  FBW complete 9/24   Virtual Regular Visit        Encounter provider Robert Halsted, MD    Provider located at [address] [place] [id number]      Recent Visits  No visits were found meeting these conditions  Showing recent visits within past 7 days and meeting all other requirements     Today's Visits  Date Type Provider Dept   01/22/21 MD Silvano aKng   Showing today's visits and meeting all other requirements     Future Appointments  No visits were found meeting these conditions  Showing future appointments within next 150 days and meeting all other requirements        The patient was identified by name and date of birth  Yancy Roberto was informed that this is a telemedicine visit and that the visit is being conducted through [address] [place] [address] and patient was informed that this is not a secure, HIPAA-compliant platform  She agrees to proceed     My office door was closed  No one else was in the room    She acknowledged consent and understanding of privacy and security of the video platform  The patient has agreed to participate and understands they can discontinue the visit at any time  Patient is aware this is a billable service  Dario Villa is a 48 y o  female    The patient is being seen for sleep today just to follow-up on her ER visit  She was taking the trash out a couple of nights ago and pivoted in a way that she stepped on a metal skew were with the left foot driving it in to the right ankle  She said it went in pretty smoothly and she removed it pretty smoothly  She did go to the ER for evaluation afterwards  They cleaned it out, she says the ER physician did an ultrasound while they were cleaning it and no foreign body was noticed  They also did an x-ray without any evidence of foreign body or trauma to the bone  She was given a tetanus shot started on Augmentin  Initially it really did not hurt much  Over the past day it has started swelling a bit though not terribly, she says it is not swollen out, just basically slightly over the skin  There is only minimal redness and no warmth  She has felt well otherwise, no fevers or chills       History reviewed  No pertinent past medical history      Past Surgical History:   Procedure Laterality Date    ENDOMETRIAL ABLATION  2001    HYSTERECTOMY      LAPAROSCOPIC OVARIAN CYSTECTOMY Left 2001    LIPOMA RESECTION Left 6081    UMBILICAL HERNIA REPAIR  2003    WISDOM TOOTH EXTRACTION  1988       Current Outpatient Medications   Medication Sig Dispense Refill    amoxicillin-clavulanate (AUGMENTIN) 875-125 mg per tablet Take 1 tablet by mouth every 12 (twelve) hours for 7 days 14 tablet 0    calcium-vitamin D (OSCAL) 250-125 MG-UNIT per tablet Take 1 tablet by mouth daily      multivitamin (THERAGRAN) TABS Take 1 tablet by mouth daily      saccharomyces boulardii (FLORASTOR) 250 mg capsule Take 250 mg by mouth 2 (two) times a day       No current facility-administered medications for this visit  Allergies   Allergen Reactions    Clindamycin Rash    Doxycycline GI Intolerance     This is not a real allergy  Review of Systems    Video Exam    Vitals:    01/22/21 0934   Weight: 72 6 kg (160 lb)   Height: 5' 4" (1 626 m)       Physical Exam  Constitutional:       General: She is not in acute distress  Appearance: She is well-developed  She is not diaphoretic  HENT:      Head: Normocephalic  Eyes:      Conjunctiva/sclera: Conjunctivae normal    Neck:      Musculoskeletal: Normal range of motion  Pulmonary:      Effort: Pulmonary effort is normal  No respiratory distress  Skin:     General: Skin is warm and dry  Neurological:      General: No focal deficit present  Mental Status: She is alert and oriented to person, place, and time  Psychiatric:         Mood and Affect: Mood normal          Behavior: Behavior normal          Thought Content: Thought content normal          Judgment: Judgment normal           I spent 15 minutes directly with the patient during this visit      VIRTUAL VISIT [address] acknowledges that she has consented to an online visit or consultation  She understands that the online visit is based solely on information provided by her, and that, in the absence of a face-to-face physical evaluation by the physician, the diagnosis she receives is both limited and provisional in terms of accuracy and completeness  This is not intended to replace a full medical face-to-face evaluation by the physician  Nilam Shukla understands and accepts these terms

## 2021-01-22 NOTE — TELEPHONE ENCOUNTER
Shameka Monday    ED Visit Information     Ed visit date: 1/21/2021  Diagnosis Description: Puncture Wound  In Network? Yes  [place]  Discharge status: Home  Discharged with meds ? Yes  Number of ED visits to date: 1  ED Severity:N/A     Outreach Information    Outreach successful: Yes 1  Date letter mailed:No  Date Finalized:1/22/2021    Care Coordination    Follow up appointment with pcp: no She will be calling today for an appt  Transportation issues ? No    [employer] type: 7 Day Outreach  [facility] PCP: Yes  Transportation: Self Transport  Called PCP first?: No  Feels able to call PCP for urgent problems ?: Yes  Understands what emergencies can be handled by PCP ?: Yes  Ever any problems getting appointment with PCP for minor emergency/urgency problems?: No  Practice Contacted Patient ?: No  Pt had ED follow up with pcp/staff ?: No    Seen for follow-up out of network ?: No  Urgent care Education?: No  01/22/2021 08:35 AM Phone ([facility]) Nickolas Parker (Self) [phone] Wandy May)   Spoke with Patient and reviewed ED visit  and ED out reach questions  She will be caling her pcp today for ED follow up since she is having some mild discomfot and OTC meds are not helping  Patient D/C with [facility]       By Fransisco Driscoll MA

## 2021-01-27 ENCOUNTER — TELEPHONE (OUTPATIENT)
Dept: FAMILY MEDICINE CLINIC | Facility: CLINIC | Age: 51
End: 2021-01-27

## 2021-01-27 NOTE — TELEPHONE ENCOUNTER
Pt states her urine looks a little cloudy and smells funky  Its been going for 3 days now  She is asking if it could be the amoxicillin-clavulanate (AUGMENTIN) 875-125 mg per tablet or what else could it be? No pain or itchy just the odor smells weird  Please advise

## 2021-02-09 DIAGNOSIS — Z23 ENCOUNTER FOR IMMUNIZATION: ICD-10-CM

## 2021-02-10 ENCOUNTER — IMMUNIZATIONS (OUTPATIENT)
Dept: FAMILY MEDICINE CLINIC | Facility: HOSPITAL | Age: 51
End: 2021-02-10

## 2021-02-10 DIAGNOSIS — Z23 ENCOUNTER FOR IMMUNIZATION: Primary | ICD-10-CM

## 2021-02-10 PROCEDURE — 0011A SARS-COV-2 / COVID-19 MRNA VACCINE (MODERNA) 100 MCG: CPT

## 2021-02-10 PROCEDURE — 91301 SARS-COV-2 / COVID-19 MRNA VACCINE (MODERNA) 100 MCG: CPT

## 2021-03-08 ENCOUNTER — OFFICE VISIT (OUTPATIENT)
Dept: FAMILY MEDICINE CLINIC | Facility: CLINIC | Age: 51
End: 2021-03-08
Payer: COMMERCIAL

## 2021-03-08 VITALS — BODY MASS INDEX: 28.17 KG/M2 | HEIGHT: 64 IN | WEIGHT: 165 LBS

## 2021-03-08 DIAGNOSIS — Z20.822 EXPOSURE TO COVID-19 VIRUS: ICD-10-CM

## 2021-03-08 DIAGNOSIS — B34.9 VIRAL INFECTION, UNSPECIFIED: ICD-10-CM

## 2021-03-08 PROBLEM — L29.0 PERIANAL PRURITUS: Status: RESOLVED | Noted: 2020-02-20 | Resolved: 2021-03-08
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
## 2021-03-08 PROBLEM — S30.861A INSECT BITE (NONVENOMOUS) OF ABDOMINAL WALL, INITIAL ENCOUNTER: Status: RESOLVED | Noted: 2020-06-18 | Resolved: 2021-03-08

## 2021-03-08 PROCEDURE — 3008F BODY MASS INDEX DOCD: CPT | Performed by: FAMILY MEDICINE

## 2021-03-08 PROCEDURE — 99214 OFFICE O/P EST MOD 30 MIN: CPT | Performed by: FAMILY MEDICINE

## 2021-03-08 PROCEDURE — 1036F TOBACCO NON-USER: CPT | Performed by: FAMILY MEDICINE

## 2021-03-08 PROCEDURE — U0003 INFECTIOUS AGENT DETECTION BY NUCLEIC ACID (DNA OR RNA); SEVERE ACUTE RESPIRATORY SYNDROME CORONAVIRUS 2 (SARS-COV-2) (CORONAVIRUS DISEASE [COVID-19]), AMPLIFIED PROBE TECHNIQUE, MAKING USE OF HIGH THROUGHPUT TECHNOLOGIES AS DESCRIBED BY CMS-2020-01-R: HCPCS | Performed by: FAMILY MEDICINE

## 2021-03-08 PROCEDURE — U0005 INFEC AGEN DETEC AMPLI PROBE: HCPCS | Performed by: FAMILY MEDICINE

## 2021-03-08 NOTE — PROGRESS NOTES
COVID-19 Virtual Visit     Assessment/Plan:    Problem List Items Addressed This Visit     None      Visit Diagnoses     Exposure to COVID-19 virus        Relevant Orders    Novel Coronavirus (Covid-19),PCR [facility] - Collected at Mobile Vans or [facility]    Viral infection, unspecified        Relevant Orders    Novel Coronavirus (Covid-19),PCR [facility] - Collected at Mobile Vans or [facility]         Disposition:     I referred patient to one of our centralized sites for a COVID-19 swab  The patient has her 2nd COVID vaccine scheduled for tomorrow at 4:00 p m  She is going to need to get tested, if she is negative she is still technically under quarantine so we should postpone the vaccine anyway  I will have the staff call her to reschedule her booster  I have spent 15 minutes directly with the patient  Encounter provider Ines Barnes MD    Provider located at [address] [place] [phone]    Recent Visits  No visits were found meeting these conditions  Showing recent visits within past 7 days and meeting all other requirements     Today's Visits  Date Type Provider Dept   03/08/21 Office Visit MD Silvano Fowler   Showing today's visits and meeting all other requirements     Future Appointments  No visits were found meeting these conditions  Showing future appointments within next 150 days and meeting all other requirements      This virtual check-in was done via [employer] and patient was informed that this is not a secure, HIPAA-compliant platform  She agrees to proceed  Patient agrees to participate in a virtual check in via telephone or video visit instead of presenting to the office to address urgent/immediate medical needs  Patient is aware this is a billable service  After connecting through [facility], the patient was identified by name and date of birth   Brando Real was informed that this was a telemedicine visit and that the exam was being conducted confidentially over secure lines  My office door was closed  No one else was in the room  Trevor El acknowledged consent and understanding of privacy and security of the telemedicine visit  I informed the patient that I have reviewed her record in Epic and presented the opportunity for her to ask any questions regarding the visit today  The patient agreed to participate  Subjective:   Trevor El is a 48 y o  female who is concerned about COVID-19  Patient's symptoms include sore throat (only on Saturday)  Patient denies fever, chills, fatigue, malaise, congestion, rhinorrhea, anosmia, loss of taste, cough, shortness of breath, chest tightness, abdominal pain, nausea, vomiting, diarrhea, myalgias and headaches  Date of symptom onset: 3/6/2021  Date of exposure: 3/3/2021    Exposure:   Contact with a person who is under investigation (PUI) for or who is positive for COVID-19 within the last 14 days?: Yes    Hospitalized recently for fever and/or lower respiratory symptoms?: No      Currently a healthcare worker that is involved in direct patient care?: No      Works in a special setting where the risk of COVID-19 transmission may be high? (this may include long-term care, correctional and group home facilities; homeless shelters; assisted-living facilities and group homes ): No      Resident in a special setting where the risk of COVID-19 transmission may be high? (this may include long-term care, correctional and group home facilities; homeless shelters; assisted-living facilities and group homes ): No      The patient is elderly mother is on hospice (her mother has been vaccinated with both doses, well over two weeks ago since her last dose)    She had visitors, her granddaughter and  niece, from last Wednesday 3/3/2021 until Saturday  3/6/2021      Her niece woke up feeling very poorly on Saturday morning and did not leave the room she was staying in, did not go around anyone in the household at that time  She found out today that she is COVID positive and notified the family  The patient herself had a sore throat on Saturday morning that resolved very quickly, she has otherwise been asymptomatic  Her son, though, who is 25, has multiple symptoms  No results found for: Ivett Edwards, [address] [facility], [address] [facility], [place] [address]  History reviewed  No pertinent past medical history  Past Surgical History:   Procedure Laterality Date    ENDOMETRIAL ABLATION  2001    HYSTERECTOMY      LAPAROSCOPIC OVARIAN CYSTECTOMY Left 2001    LIPOMA RESECTION Left 7389    UMBILICAL HERNIA REPAIR  2003    WISDOM TOOTH EXTRACTION  1988     Current Outpatient Medications   Medication Sig Dispense Refill    calcium-vitamin D (OSCAL) 250-125 MG-UNIT per tablet Take 1 tablet by mouth daily      multivitamin (THERAGRAN) TABS Take 1 tablet by mouth daily      Probiotic Product (PROBIOTIC DAILY PO) Take by mouth       No current facility-administered medications for this visit  Allergies   Allergen Reactions    Clindamycin Rash    Doxycycline GI Intolerance     This is not a real allergy  Review of Systems   Constitutional: Negative for chills, fatigue and fever  HENT: Positive for sore throat (only on Saturday)  Negative for congestion and rhinorrhea  Respiratory: Negative for cough, chest tightness and shortness of breath  Gastrointestinal: Negative for abdominal pain, diarrhea, nausea and vomiting  Musculoskeletal: Negative for myalgias  Neurological: Negative for headaches  Objective:    Vitals:    03/08/21 1342   Weight: 74 8 kg (165 lb)   Height: 5' 4" (1 626 m)       Physical Exam  Constitutional:       General: She is not in acute distress  Appearance: She is well-developed  She is not diaphoretic  Eyes:      Conjunctiva/sclera: Conjunctivae normal    Neck:      Musculoskeletal: Neck supple     Pulmonary:      Effort: Pulmonary effort is normal  No respiratory distress  Skin:     General: Skin is warm and dry  Neurological:      Mental Status: She is alert and oriented to person, place, and time  Psychiatric:         Behavior: Behavior normal          Thought Content: Thought content normal          Judgment: Judgment normal        VIRTUAL VISIT DISCLAIMER    Nilam Shukla acknowledges that she has consented to an online visit or consultation  She understands that the online visit is based solely on information provided by her, and that, in the absence of a face-to-face physical evaluation by the physician, the diagnosis she receives is both limited and provisional in terms of accuracy and completeness  This is not intended to replace a full medical face-to-face evaluation by the physician  Nilam Shukla understands and accepts these terms

## 2021-03-09 ENCOUNTER — IMMUNIZATIONS (OUTPATIENT)
Dept: FAMILY MEDICINE CLINIC | Facility: HOSPITAL | Age: 51
End: 2021-03-09

## 2021-03-09 ENCOUNTER — TELEPHONE (OUTPATIENT)
Dept: FAMILY MEDICINE CLINIC | Facility: CLINIC | Age: 51
End: 2021-03-09

## 2021-03-09 DIAGNOSIS — Z23 ENCOUNTER FOR IMMUNIZATION: Primary | ICD-10-CM

## 2021-03-09 LAB — SARS-COV-2 RNA RESP QL NAA+PROBE: NEGATIVE

## 2021-03-09 PROCEDURE — 0012A SARS-COV-2 / COVID-19 MRNA VACCINE (MODERNA) 100 MCG: CPT

## 2021-03-09 PROCEDURE — 91301 SARS-COV-2 / COVID-19 MRNA VACCINE (MODERNA) 100 MCG: CPT

## 2021-03-09 NOTE — TELEPHONE ENCOUNTER
So at this point I think, to be on the safe side, she should wear the mask until she has been a full 10 days out from any exposure  This includes Will  So if she has really not seen Will since he started getting symptoms Sunday then she can count 10 days from that

## 2021-03-09 NOTE — TELEPHONE ENCOUNTER
Pt states her exposure was greater than Will's exposure to the original person who had COVID in their family  She wants to know if she still needs to wear a mask and be careful around mom since she tested negative and is feeling fine  Does she still have the chance of passing something to her mom?

## 2021-03-15 ENCOUNTER — TELEPHONE (OUTPATIENT)
Dept: FAMILY MEDICINE CLINIC | Facility: CLINIC | Age: 51
End: 2021-03-15

## 2021-03-15 NOTE — TELEPHONE ENCOUNTER
Patient needs some advice on quarantining  There is one person left in the family that did not contract jimmary Demarco's mother, who is on hospice, started with sx on March 9th, last Tuesday  Her hospice nurse is assuming she has it  Rachel Kaiser is not sure on the quarantining protocol  Do they all have to start from March 9th or is it based on when their sx each started      Please advise at [phone]

## 2021-03-15 NOTE — TELEPHONE ENCOUNTER
So for those who have had COVID already they do not need to quarantine any longer than the initial 10 days of their illness  Once they have passed the 10 days they are considered no longer contagious and they should not be able to contract it again either, at least not for a few months  If they are assuming Miltonel Siemens has it Roel Siemens should not be around anyone who has not had it for 10 days from when her symptoms started  If this does not answer her question please let me know

## 2021-11-10 ENCOUNTER — HOSPITAL ENCOUNTER (EMERGENCY)
Facility: HOSPITAL | Age: 51
Discharge: HOME | End: 2021-11-10
Attending: EMERGENCY MEDICINE
Payer: COMMERCIAL

## 2021-11-10 ENCOUNTER — APPOINTMENT (EMERGENCY)
Dept: RADIOLOGY | Facility: HOSPITAL | Age: 51
End: 2021-11-10
Payer: COMMERCIAL

## 2021-11-10 VITALS
SYSTOLIC BLOOD PRESSURE: 181 MMHG | TEMPERATURE: 97.6 F | DIASTOLIC BLOOD PRESSURE: 91 MMHG | HEART RATE: 77 BPM | BODY MASS INDEX: 29.88 KG/M2 | OXYGEN SATURATION: 99 % | RESPIRATION RATE: 18 BRPM | WEIGHT: 175 LBS | HEIGHT: 64 IN
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
## 2021-11-10 DIAGNOSIS — S70.02XA CONTUSION OF LEFT HIP, INITIAL ENCOUNTER: ICD-10-CM

## 2021-11-10 DIAGNOSIS — S60.222A CONTUSION OF LEFT HAND, INITIAL ENCOUNTER: Primary | ICD-10-CM

## 2021-11-10 PROCEDURE — 73130 X-RAY EXAM OF HAND: CPT

## 2021-11-10 PROCEDURE — 99284 EMERGENCY DEPT VISIT MOD MDM: CPT

## 2021-11-10 PROCEDURE — 73502 X-RAY EXAM HIP UNI 2-3 VIEWS: CPT

## 2021-11-10 PROCEDURE — 99285 EMERGENCY DEPT VISIT HI MDM: CPT | Performed by: EMERGENCY MEDICINE

## 2021-11-11 ENCOUNTER — VBI (OUTPATIENT)
Dept: FAMILY MEDICINE CLINIC | Facility: CLINIC | Age: 51
End: 2021-11-11

## 2022-01-14 ENCOUNTER — TELEPHONE (OUTPATIENT)
Dept: FAMILY MEDICINE CLINIC | Facility: CLINIC | Age: 52
End: 2022-01-14

## 2022-01-14 NOTE — TELEPHONE ENCOUNTER
Pt call and said her lower back hurting and she like curious says it hurts like her kidney it comes and go and it not sharp no fever says she said no other pain that is hurting and want to know what can she do or what to do with the pain

## 2022-01-14 NOTE — TELEPHONE ENCOUNTER
If you have urinary symptoms, pain or burning with urination or blood in your urine, you should be evaluated for urinary tract infection  If it is your low back and hurts when you move, you can try some ibuprofen and heat 15 min over the area to see if it improves  Please schedule an evaluation if it is ongoing

## 2022-01-15 NOTE — TELEPHONE ENCOUNTER
Left detailed message on machine for patient  Advised she may go to urgent care over the weekend if still with symptoms or we will be back in the office on Monday

## 2022-02-02 ENCOUNTER — OFFICE VISIT (OUTPATIENT)
Dept: FAMILY MEDICINE CLINIC | Facility: CLINIC | Age: 52
End: 2022-02-02
Payer: COMMERCIAL

## 2022-02-02 VITALS
WEIGHT: 187 LBS | BODY MASS INDEX: 31.92 KG/M2 | DIASTOLIC BLOOD PRESSURE: 70 MMHG | TEMPERATURE: 97.4 F | SYSTOLIC BLOOD PRESSURE: 102 MMHG | OXYGEN SATURATION: 98 % | HEART RATE: 81 BPM | HEIGHT: 64 IN

## 2022-02-02 DIAGNOSIS — Z13.220 SCREENING FOR LIPID DISORDERS: ICD-10-CM

## 2022-02-02 DIAGNOSIS — Z13.29 SCREENING FOR THYROID DISORDER: ICD-10-CM

## 2022-02-02 DIAGNOSIS — Z13.1 SCREENING FOR DIABETES MELLITUS: ICD-10-CM

## 2022-02-02 DIAGNOSIS — R00.2 PALPITATIONS: Primary | ICD-10-CM

## 2022-02-02 DIAGNOSIS — R73.09 ELEVATED GLUCOSE: ICD-10-CM

## 2022-02-02 DIAGNOSIS — Z13.0 SCREENING, ANEMIA, DEFICIENCY, IRON: ICD-10-CM

## 2022-02-02 DIAGNOSIS — Z12.31 ENCOUNTER FOR SCREENING MAMMOGRAM FOR BREAST CANCER: ICD-10-CM

## 2022-02-02 PROCEDURE — 1036F TOBACCO NON-USER: CPT | Performed by: PHYSICIAN ASSISTANT

## 2022-02-02 PROCEDURE — 3008F BODY MASS INDEX DOCD: CPT | Performed by: PHYSICIAN ASSISTANT

## 2022-02-02 PROCEDURE — 93000 ELECTROCARDIOGRAM COMPLETE: CPT | Performed by: PHYSICIAN ASSISTANT

## 2022-02-02 PROCEDURE — 99214 OFFICE O/P EST MOD 30 MIN: CPT | Performed by: PHYSICIAN ASSISTANT

## 2022-02-02 NOTE — PROGRESS NOTES
Assessment/Plan:    Palpitations-   EKG normal  Patient no longer having sx , if sx  Re-occur or   Other sx  Occur, call office   Discussed possible causes of palpitations- ex  Increased caffeine  Increased salt or sugar in diet, glucose fluctuations, increased  Stress, etc         Problem List Items Addressed This Visit     None      Visit Diagnoses     Encounter for screening mammogram for breast cancer    -  Primary    Relevant Orders    Mammo screening bilateral w 3d & cad    Screening, anemia, deficiency, iron        Relevant Orders    CBC and differential    Screening for diabetes mellitus        Relevant Orders    Comprehensive metabolic panel    Screening for lipid disorders        Relevant Orders    Lipid Panel with Direct LDL reflex    Screening for thyroid disorder        Relevant Orders    TSH, 3rd generation with Free T4 reflex    Elevated glucose        Relevant Orders    Hemoglobin A1C W/Refl To Glycomark(R)            Subjective:      Patient ID: Makayla Nina is a 46 y o  female  Presents with recent episode of lower back pain, and had some sharp pain lower abdominal area  Was traveling/driving to [place] to visit relatives  Now feeling much better as far as the pain  Also had palpitations, started about 3 weeks ago, had the COVID booster about 1 week prior to that  Occurred almost daily, or every other day, and intermittent  Got back from [place], end of last week, and no palpitations since then  Had ovaries removed, June 2019, and no menses since then  Review of Systems   Constitutional: Negative for chills, diaphoresis, fatigue and fever  HENT: Negative for congestion, ear pain, rhinorrhea and sore throat  Eyes: Negative for pain, discharge and visual disturbance  Respiratory: Negative for cough, chest tightness and shortness of breath  Cardiovascular: Positive for palpitations  Negative for chest pain and leg swelling     Gastrointestinal: Negative for abdominal pain, constipation, diarrhea, nausea and vomiting  Endocrine: Negative for polydipsia, polyphagia and polyuria  Genitourinary: Negative for dysuria, flank pain, frequency, menstrual problem and vaginal discharge  Musculoskeletal: Positive for back pain  Neurological: Negative for dizziness, light-headedness, numbness and headaches  Objective:      /70   Pulse 81   Temp (!) 97 4 °F (36 3 °C)   Ht 5' 4" (1 626 m)   Wt 84 8 kg (187 lb)   LMP 06/26/2019 (Within Days)   SpO2 98%   BMI 32 10 kg/m²          Physical Exam  Constitutional:       General: She is not in acute distress  Appearance: Normal appearance  She is not ill-appearing, toxic-appearing or diaphoretic  HENT:      Head: Normocephalic and atraumatic  Cardiovascular:      Rate and Rhythm: Normal rate and regular rhythm  Pulses: Normal pulses  Heart sounds: Normal heart sounds  Pulmonary:      Effort: Pulmonary effort is normal  No respiratory distress  Breath sounds: Normal breath sounds  No stridor  No wheezing or rhonchi  Abdominal:      General: There is no distension  Palpations: Abdomen is soft  There is no mass  Tenderness: There is no abdominal tenderness  There is no guarding or rebound  Hernia: No hernia is present  Musculoskeletal:         General: No swelling, tenderness, deformity or signs of injury  Normal range of motion  Right lower leg: No edema  Left lower leg: No edema  Neurological:      General: No focal deficit present  Mental Status: She is alert and oriented to person, place, and time  Cranial Nerves: No cranial nerve deficit  Sensory: No sensory deficit  Motor: No weakness  Coordination: Coordination normal       Gait: Gait normal    Psychiatric:         Mood and Affect: Mood normal          Behavior: Behavior normal          Thought Content:  Thought content normal          Judgment: Judgment normal

## 2022-04-05 ENCOUNTER — HOSPITAL ENCOUNTER (OUTPATIENT)
Dept: MAMMOGRAPHY | Facility: IMAGING CENTER | Age: 52
Discharge: HOME | End: 2022-04-05
Payer: COMMERCIAL

## 2022-04-05 VITALS — WEIGHT: 185 LBS | HEIGHT: 64 IN | BODY MASS INDEX: 31.58 KG/M2

## 2022-04-05 DIAGNOSIS — Z12.31 ENCOUNTER FOR SCREENING MAMMOGRAM FOR BREAST CANCER: ICD-10-CM

## 2022-04-05 PROCEDURE — 77067 SCR MAMMO BI INCL CAD: CPT

## 2022-04-05 PROCEDURE — 77063 BREAST TOMOSYNTHESIS BI: CPT

## 2022-05-03 ENCOUNTER — OFFICE VISIT (OUTPATIENT)
Dept: URGENT CARE | Facility: CLINIC | Age: 52
End: 2022-05-03
Payer: COMMERCIAL

## 2022-05-03 VITALS
HEART RATE: 76 BPM | BODY MASS INDEX: 31.92 KG/M2 | DIASTOLIC BLOOD PRESSURE: 72 MMHG | RESPIRATION RATE: 18 BRPM | OXYGEN SATURATION: 99 % | HEIGHT: 64 IN | TEMPERATURE: 98.3 F | WEIGHT: 187 LBS | SYSTOLIC BLOOD PRESSURE: 118 MMHG

## 2022-05-03 DIAGNOSIS — H92.02 EARACHE ON LEFT: Primary | ICD-10-CM

## 2022-05-03 PROCEDURE — 99213 OFFICE O/P EST LOW 20 MIN: CPT | Performed by: PHYSICIAN ASSISTANT

## 2022-05-03 RX ORDER — PREDNISONE 10 MG/1
TABLET ORAL
Qty: 21 TABLET | Refills: 0 | Status: SHIPPED | OUTPATIENT
Start: 2022-05-03 | End: 2022-05-14

## 2022-05-14 ENCOUNTER — APPOINTMENT (EMERGENCY)
Dept: CT IMAGING | Facility: HOSPITAL | Age: 52
End: 2022-05-14
Payer: COMMERCIAL

## 2022-05-14 ENCOUNTER — AMB VIDEO VISIT (OUTPATIENT)
Dept: OTHER | Facility: HOSPITAL | Age: 52
End: 2022-05-14

## 2022-05-14 ENCOUNTER — HOSPITAL ENCOUNTER (EMERGENCY)
Facility: HOSPITAL | Age: 52
Discharge: HOME | End: 2022-05-14
Attending: EMERGENCY MEDICINE
Payer: COMMERCIAL

## 2022-05-14 VITALS
OXYGEN SATURATION: 100 % | SYSTOLIC BLOOD PRESSURE: 132 MMHG | DIASTOLIC BLOOD PRESSURE: 86 MMHG | HEART RATE: 76 BPM | RESPIRATION RATE: 18 BRPM

## 2022-05-14 DIAGNOSIS — R42 DIZZINESS: Primary | ICD-10-CM

## 2022-05-14 DIAGNOSIS — R42 VERTIGO: Primary | ICD-10-CM

## 2022-05-14 LAB
ALBUMIN SERPL BCP-MCNC: 4.2 G/DL (ref 3–5.2)
ALP SERPL-CCNC: 82 U/L (ref 43–122)
ALT SERPL W P-5'-P-CCNC: 20 U/L
ANION GAP SERPL CALCULATED.3IONS-SCNC: 7 MMOL/L (ref 5–14)
AST SERPL W P-5'-P-CCNC: 25 U/L (ref 14–36)
BASOPHILS # BLD AUTO: 0.05 THOUSANDS/ΜL (ref 0–0.1)
BASOPHILS NFR BLD AUTO: 1 % (ref 0–1)
BILIRUB SERPL-MCNC: 0.56 MG/DL
BUN SERPL-MCNC: 20 MG/DL (ref 5–25)
CALCIUM SERPL-MCNC: 9 MG/DL (ref 8.4–10.2)
CARDIAC TROPONIN I PNL SERPL HS: <2 NG/L
CHLORIDE SERPL-SCNC: 105 MMOL/L (ref 97–108)
CO2 SERPL-SCNC: 27 MMOL/L (ref 22–30)
CREAT SERPL-MCNC: 0.77 MG/DL (ref 0.6–1.2)
EOSINOPHIL # BLD AUTO: 0.17 THOUSAND/ΜL (ref 0–0.61)
EOSINOPHIL NFR BLD AUTO: 2 % (ref 0–6)
ERYTHROCYTE [DISTWIDTH] IN BLOOD BY AUTOMATED COUNT: 13.2 % (ref 11.6–15.1)
GFR SERPL CREATININE-BSD FRML MDRD: 89 ML/MIN/1.73SQ M
GLUCOSE SERPL-MCNC: 88 MG/DL (ref 70–99)
HCT VFR BLD AUTO: 42.1 % (ref 34.8–46.1)
HGB BLD-MCNC: 13.3 G/DL (ref 11.5–15.4)
IMM GRANULOCYTES # BLD AUTO: 0.02 THOUSAND/UL (ref 0–0.2)
IMM GRANULOCYTES NFR BLD AUTO: 0 % (ref 0–2)
LYMPHOCYTES # BLD AUTO: 2.12 THOUSANDS/ΜL (ref 0.6–4.47)
LYMPHOCYTES NFR BLD AUTO: 25 % (ref 14–44)
MAGNESIUM SERPL-MCNC: 2.2 MG/DL (ref 1.6–2.3)
MCH RBC QN AUTO: 28.2 PG (ref 26.8–34.3)
MCHC RBC AUTO-ENTMCNC: 31.6 G/DL (ref 31.4–37.4)
MCV RBC AUTO: 89 FL (ref 82–98)
MONOCYTES # BLD AUTO: 0.72 THOUSAND/ΜL (ref 0.17–1.22)
MONOCYTES NFR BLD AUTO: 9 % (ref 4–12)
NEUTROPHILS # BLD AUTO: 5.35 THOUSANDS/ΜL (ref 1.85–7.62)
NEUTS SEG NFR BLD AUTO: 63 % (ref 43–75)
NRBC BLD AUTO-RTO: 0 /100 WBCS
PLATELET # BLD AUTO: 236 THOUSANDS/UL (ref 149–390)
PMV BLD AUTO: 8.7 FL (ref 8.9–12.7)
POTASSIUM SERPL-SCNC: 3.7 MMOL/L (ref 3.6–5)
PROT SERPL-MCNC: 7.4 G/DL (ref 5.9–8.4)
RBC # BLD AUTO: 4.72 MILLION/UL (ref 3.81–5.12)
SODIUM SERPL-SCNC: 139 MMOL/L (ref 137–147)
WBC # BLD AUTO: 8.43 THOUSAND/UL (ref 4.31–10.16)

## 2022-05-14 PROCEDURE — G1004 CDSM NDSC: HCPCS

## 2022-05-14 PROCEDURE — 36415 COLL VENOUS BLD VENIPUNCTURE: CPT | Performed by: EMERGENCY MEDICINE

## 2022-05-14 PROCEDURE — 96361 HYDRATE IV INFUSION ADD-ON: CPT

## 2022-05-14 PROCEDURE — 93005 ELECTROCARDIOGRAM TRACING: CPT

## 2022-05-14 PROCEDURE — 99284 EMERGENCY DEPT VISIT MOD MDM: CPT

## 2022-05-14 PROCEDURE — 84484 ASSAY OF TROPONIN QUANT: CPT | Performed by: EMERGENCY MEDICINE

## 2022-05-14 PROCEDURE — 99285 EMERGENCY DEPT VISIT HI MDM: CPT | Performed by: EMERGENCY MEDICINE

## 2022-05-14 PROCEDURE — 85025 COMPLETE CBC W/AUTO DIFF WBC: CPT | Performed by: EMERGENCY MEDICINE

## 2022-05-14 PROCEDURE — 70450 CT HEAD/BRAIN W/O DYE: CPT

## 2022-05-14 PROCEDURE — ECARE PR SL URGENT CARE VIRTUAL VISIT: Performed by: NURSE PRACTITIONER

## 2022-05-14 PROCEDURE — 83735 ASSAY OF MAGNESIUM: CPT | Performed by: EMERGENCY MEDICINE

## 2022-05-14 PROCEDURE — 80053 COMPREHEN METABOLIC PANEL: CPT | Performed by: EMERGENCY MEDICINE

## 2022-05-14 PROCEDURE — 96374 THER/PROPH/DIAG INJ IV PUSH: CPT

## 2022-05-14 RX ORDER — MECLIZINE HCL 12.5 MG/1
25 TABLET ORAL ONCE
Status: COMPLETED | OUTPATIENT
Start: 2022-05-14 | End: 2022-05-14

## 2022-05-14 RX ORDER — MECLIZINE HYDROCHLORIDE 25 MG/1
25 TABLET ORAL 3 TIMES DAILY PRN
Qty: 12 TABLET | Refills: 0 | Status: SHIPPED | OUTPATIENT
Start: 2022-05-14 | End: 2022-05-18

## 2022-05-14 RX ORDER — ONDANSETRON 4 MG/1
4 TABLET, ORALLY DISINTEGRATING ORAL ONCE
Status: COMPLETED | OUTPATIENT
Start: 2022-05-14 | End: 2022-05-14

## 2022-05-14 RX ORDER — DIAZEPAM 5 MG/ML
2.5 INJECTION, SOLUTION INTRAMUSCULAR; INTRAVENOUS ONCE
Status: DISCONTINUED | OUTPATIENT
Start: 2022-05-14 | End: 2022-05-14 | Stop reason: HOSPADM

## 2022-05-14 RX ORDER — ONDANSETRON 2 MG/ML
4 INJECTION INTRAMUSCULAR; INTRAVENOUS ONCE
Status: COMPLETED | OUTPATIENT
Start: 2022-05-14 | End: 2022-05-14

## 2022-05-14 RX ORDER — ONDANSETRON 4 MG/1
4 TABLET, ORALLY DISINTEGRATING ORAL EVERY 8 HOURS PRN
Qty: 6 TABLET | Refills: 0 | Status: SHIPPED | OUTPATIENT
Start: 2022-05-14 | End: 2022-05-16

## 2022-05-14 RX ADMIN — MECLIZINE 25 MG: 12.5 TABLET ORAL at 17:42

## 2022-05-14 RX ADMIN — ONDANSETRON 4 MG: 4 TABLET, ORALLY DISINTEGRATING ORAL at 20:39

## 2022-05-14 RX ADMIN — MECLIZINE 25 MG: 12.5 TABLET ORAL at 20:39

## 2022-05-14 RX ADMIN — SODIUM CHLORIDE 1000 ML: 0.9 INJECTION, SOLUTION INTRAVENOUS at 17:41

## 2022-05-14 RX ADMIN — ONDANSETRON 4 MG: 2 INJECTION INTRAMUSCULAR; INTRAVENOUS at 17:42

## 2022-05-14 NOTE — PROGRESS NOTES
Video Visit - Lauryn Juarez 46 y o  female MRN: [id number]    REQUIRED DOCUMENTATION:         1  This service was provided via [facility]  2  Provider located at [address] [place] [id number]  3  [facility] provider: YAZMIN Nam  4  Identify all parties in room with patient during [facility] visit:  Patient   5  After connecting through [employer], patient was identified by name and date of birth  Patient was then informed that this was a Telemedicine visit and that the exam was being conducted confidentially over secure lines  My office door was closed  No one else was in the room  Patient acknowledged consent and understanding of privacy and security of the Telemedicine visit  I informed the patient that I have reviewed their record in Epic and presented the opportunity for them to ask any questions regarding the visit today  The patient agreed to participate  This is a 46year old female here today with video visit  She was seen in [facility] several weeks ago  She was treated with prednisone for ear pain  She states she was having sharp shooting pain in the left ear  She had vertigo at that time, those symptoms resolved  Today symptoms restarted  She feels wobbling and off balance with ambulation  She states symptoms started this morning  She is having nausea and headache  No feels fatigue but specific extremity weakness  No slurred speech  No chest pain or sob  She states she has had vertigo in past but this seems different  She states it does not feel like room is spinning but feels off balance and like she can not walk straight  Review of Systems   Constitutional: Negative for activity change, chills, fatigue and fever  HENT: Negative  Negative for congestion, ear pain, rhinorrhea, sinus pressure and sinus pain  Respiratory: Negative  Cardiovascular: Negative  Neurological: Positive for dizziness and light-headedness     Psychiatric/Behavioral: Negative  Physical Exam  Constitutional:       General: She is not in acute distress  Appearance: Normal appearance  She is not ill-appearing or toxic-appearing  HENT:      Head: Normocephalic and atraumatic  Eyes:      Conjunctiva/sclera: Conjunctivae normal    Pulmonary:      Effort: Pulmonary effort is normal  No respiratory distress  Comments: No cough  Skin:     Comments: No rash on head or neck  Neurological:      General: No focal deficit present  Mental Status: She is alert and oriented to person, place, and time  Cranial Nerves: No cranial nerve deficit  Motor: No weakness  Psychiatric:         Mood and Affect: Mood normal          Behavior: Behavior normal          Thought Content: Thought content normal          Judgment: Judgment normal        Diagnoses and all orders for this visit:    Dizziness  -     Transfer to other facility      Patient Instructions   A we discussed,  I would recommend you go to ER for further evaluation  You were agreeable  Follow up with PCP if not improved, if symptoms are worse, go to the ER

## 2022-05-14 NOTE — CARE ANYWHERE EVISITS
Visit Summary for Cem Lozada - Gender: Female - Date of Birth: [id number]  Date: [id number] - Duration: 13 minutes  Patient: Cem Lozada  Provider: [id number]    Patient Contact Information  Address  [address] [place] [address]; [address] [place] [address]  [phone]    Visit Topics    Triage Questions   What is your current physical address in the event of a medical emergency? Answer []  Are you allergic to any medications? Answer []  Are you now or could you be pregnant? Answer []  Do you have any immune system compromise or chronic lung   disease? Answer []  Do you have any vulnerable family members in the home (infant, pregnant, cancer, elderly)? Answer []     Conversation Transcripts  [0A][0A] [Notification] You are connected with Amelia Song [place] [address] is located in [place]  [0A][Notification] Cem Lozada has shared health history  Irl Eugenia  [0A]    Diagnosis  Dizziness and giddiness    Procedures  Value: [id number] Code: CPT-4 UNLISTED E&M SERVICE    Medications Prescribed    No prescriptions ordered    Electronically signed by: Amelia White(NPI [id number])

## 2022-05-14 NOTE — ED PROVIDER NOTES
History  Chief Complaint   Patient presents with    Vertigo - Recurrent     Hx of same, started this AM with nausea and she states she is unable to walk due to the dizziness     Patient is a 19-year-old female coming in today with dizziness  She states that she is otherwise healthy and had some vertigo several weeks ago  She woke up and felt that the room was spinning  She states that she felt like she could not walk a straight line  She went to a local Urgent Center where they gave her prednisone  She states at that time she has some sharp shooting pain near her left ear but was reassured that this was not an ear infection  She does have a family history of her grandfather and brother with Meniere's disease  Patient states that she woke up this morning and felt dizzy  She describes it as the room spinning  She has associated nausea without any vomiting  She did not fall or hit her head  No recent travel, sick contacts recent antibiotic use  No ear pain, sore throat  Tolerating p o  Well  She denies any tinnitus, amaurosis fugax, focal weakness throughout the bilateral upper extremities or lower extremities  She denies any focal paresthesias throughout the bilateral upper extremities or lower patient's  She did not take anything for this        History provided by:  Patient  Dizziness  Quality:  Lightheadedness and room spinning  Severity:  Moderate  Onset quality:  Gradual  Timing:  Constant  Progression:  Unchanged  Chronicity:  Recurrent  Context: head movement    Context: not when bending over, not with bowel movement, not with ear pain, not with eye movement, not with inactivity, not with loss of consciousness, not with medication, not with physical activity, not when standing up and not when urinating    Relieved by:  None tried  Worsened by:  Nothing  Ineffective treatments:  None tried  Associated symptoms: no blood in stool, no chest pain, no diarrhea, no headaches, no hearing loss, no nausea, no palpitations, no shortness of breath, no syncope, no tinnitus, no vision changes, no vomiting and no weakness    Risk factors: no anemia, no heart disease, no hx of stroke, no hx of vertigo, no Meniere's disease, no multiple medications and no new medications        Prior to Admission Medications   Prescriptions Last Dose Informant Patient Reported? Taking?   multivitamin (THERAGRAN) TABS Past Week at Unknown time Self Yes Yes   Sig: Take 1 tablet by mouth daily      Facility-Administered Medications: None       Past Medical History:   Diagnosis Date    BRCA1 negative     BRCA2 negative        Past Surgical History:   Procedure Laterality Date    ENDOMETRIAL ABLATION  2001    LAPAROSCOPIC OVARIAN CYSTECTOMY Left 2001    LIPOMA RESECTION Left 2003    OOPHORECTOMY Bilateral 2019    TUBAL LIGATION  4064    UMBILICAL HERNIA REPAIR  2003    WISDOM TOOTH EXTRACTION  1988       Family History   Problem Relation Age of Onset    Ovarian cancer Mother 72    Glaucoma Mother     Cancer Mother     Skin cancer Mother         80's-multiple skin ca    Lung cancer Mother         Late [de-identified]    Coronary artery disease Father     Diabetes Father     Diabetes Brother     Diabetes Brother     Cirrhosis Brother     Alcohol abuse Brother     No Known Problems Daughter     No Known Problems Maternal Grandmother     No Known Problems Maternal Grandfather     No Known Problems Paternal Grandmother     No Known Problems Paternal Grandfather     No Known Problems Maternal Aunt     No Known Problems Maternal Aunt     No Known Problems Paternal Aunt     No Known Problems Paternal Aunt     No Known Problems Paternal Aunt     Colon cancer Neg Hx     Rectal cancer Neg Hx     Colon polyps Neg Hx      I have reviewed and agree with the history as documented      E-Cigarette/Vaping    E-Cigarette Use Never User      E-Cigarette/Vaping Substances    Nicotine No     THC No     CBD No     Flavoring No     Other No     Unknown No      Social History     Tobacco Use    Smoking status: Never Smoker    Smokeless tobacco: Never Used   Vaping Use    Vaping Use: Never used   Substance Use Topics    Alcohol use: No    Drug use: Never       Review of Systems   Constitutional: Negative  Negative for chills and fever  HENT: Negative  Negative for ear pain, hearing loss, sore throat and tinnitus  Eyes: Negative  Negative for pain and visual disturbance  Respiratory: Negative  Negative for cough and shortness of breath  Cardiovascular: Negative  Negative for chest pain, palpitations and syncope  Gastrointestinal: Negative  Negative for abdominal pain, blood in stool, diarrhea, nausea and vomiting  Genitourinary: Negative  Negative for dysuria and hematuria  Musculoskeletal: Negative  Negative for arthralgias and back pain  Skin: Negative  Negative for color change and rash  Neurological: Positive for dizziness  Negative for seizures, syncope, weakness and headaches  Hematological: Negative  Psychiatric/Behavioral: Negative  All other systems reviewed and are negative  Physical Exam  Physical Exam  Vitals and nursing note reviewed  Constitutional:       General: She is not in acute distress  Appearance: She is well-developed  HENT:      Head: Normocephalic and atraumatic  Comments: Patient maintaining airway and secretions  No stridor   No brawniness under tongue  Mouth/Throat:      Mouth: Mucous membranes are moist    Eyes:      Extraocular Movements: Extraocular movements intact  Conjunctiva/sclera: Conjunctivae normal       Pupils: Pupils are equal, round, and reactive to light  Cardiovascular:      Rate and Rhythm: Normal rate and regular rhythm  Heart sounds: No murmur heard  Pulmonary:      Effort: Pulmonary effort is normal  No respiratory distress  Breath sounds: Normal breath sounds  Abdominal:      Palpations: Abdomen is soft  Tenderness: There is no abdominal tenderness  Musculoskeletal:         General: Normal range of motion  Cervical back: Neck supple  Skin:     General: Skin is warm and dry  Capillary Refill: Capillary refill takes less than 2 seconds  Neurological:      General: No focal deficit present  Mental Status: She is alert and oriented to person, place, and time  GCS: GCS eye subscore is 4  GCS verbal subscore is 5  GCS motor subscore is 6  Cranial Nerves: Cranial nerves are intact  Sensory: Sensation is intact  Motor: Motor function is intact  Coordination: Coordination is intact  Gait: Gait is intact  Comments: Positive [place]-Hallpike to the left with nystagmus  No focal neuro deficits  Negative pronator drift  No facial asymmetry  No tongue deviation  NIH 0   Psychiatric:         Mood and Affect: Mood normal          Thought Content:  Thought content normal          Judgment: Judgment normal          Vital Signs  ED Triage Vitals [05/14/22 1659]   Temp Pulse Respirations Blood Pressure SpO2   -- 80 16 160/94 96 %      Temp src Heart Rate Source Patient Position - Orthostatic VS BP Location FiO2 (%)   -- Monitor Lying Left arm --      Pain Score       --           Vitals:    05/14/22 1659   BP: 160/94   Pulse: 80   Patient Position - Orthostatic VS: Lying         Visual Acuity      ED Medications  Medications   diazepam (VALIUM) injection 2 5 mg (2 5 mg Intravenous Not Given 5/14/22 1952)   sodium chloride 0 9 % bolus 1,000 mL (0 mL Intravenous Stopped 5/14/22 1916)   ondansetron (ZOFRAN) injection 4 mg (4 mg Intravenous Given 5/14/22 1742)   meclizine (ANTIVERT) tablet 25 mg (25 mg Oral Given 5/14/22 1742)       Diagnostic Studies  Results Reviewed     Procedure Component Value Units Date/Time    HS Troponin 0hr (reflex protocol) [[id number]]  (Normal) Collected: 05/14/22 1742    Lab Status: Final result Specimen: Blood from Arm, Right Updated: 05/14/22 1815     hs TnI 0hr <2 ng/L     Comprehensive metabolic panel [[id number]] Collected: 05/14/22 1742    Lab Status: Final result Specimen: Blood from Arm, Right Updated: 05/14/22 1806     Sodium 139 mmol/L      Potassium 3 7 mmol/L      Chloride 105 mmol/L      CO2 27 mmol/L      ANION GAP 7 mmol/L      BUN 20 mg/dL      Creatinine 0 77 mg/dL      Glucose 88 mg/dL      Calcium 9 0 mg/dL      AST 25 U/L      ALT 20 U/L      Alkaline Phosphatase 82 U/L      Total Protein 7 4 g/dL      Albumin 4 2 g/dL      Total Bilirubin 0 56 mg/dL      eGFR 89 ml/min/1 73sq m     Narrative:      National Kidney Disease Foundation guidelines for Chronic Kidney Disease (CKD):     Stage 1 with normal or high GFR (GFR > 90 mL/min/1 73 square meters)    Stage 2 Mild CKD (GFR = 60-89 mL/min/1 73 square meters)    Stage 3A Moderate CKD (GFR = 45-59 mL/min/1 73 square meters)    Stage 3B Moderate CKD (GFR = 30-44 mL/min/1 73 square meters)    Stage 4 Severe CKD (GFR = 15-29 mL/min/1 73 square meters)    Stage 5 End Stage CKD (GFR <15 mL/min/1 73 square meters)  Note: GFR calculation is accurate only with a steady state creatinine    Magnesium [[id number]]  (Normal) Collected: 05/14/22 1742    Lab Status: Final result Specimen: Blood from Arm, Right Updated: 05/14/22 1806     Magnesium 2 2 mg/dL     CBC and differential [[id number]]  (Abnormal) Collected: 05/14/22 1742    Lab Status: Final result Specimen: Blood from Arm, Right Updated: 05/14/22 1752     WBC 8 43 Thousand/uL      RBC 4 72 Million/uL      Hemoglobin 13 3 g/dL      Hematocrit 42 1 %      MCV 89 fL      MCH 28 2 pg      MCHC 31 6 g/dL      RDW 13 2 %      MPV 8 7 fL      Platelets 652 Thousands/uL      nRBC 0 /100 WBCs      Neutrophils Relative 63 %      Immat GRANS % 0 %      Lymphocytes Relative 25 %      Monocytes Relative 9 %      Eosinophils Relative 2 %      Basophils Relative 1 %      Neutrophils Absolute 5 35 Thousands/µL      Immature Grans Absolute 0 02 Thousand/uL Lymphocytes Absolute 2 12 Thousands/µL      Monocytes Absolute 0 72 Thousand/µL      Eosinophils Absolute 0 17 Thousand/µL      Basophils Absolute 0 05 Thousands/µL                  CT head without contrast   Final Result by Alicia Alfaro DO (05/14 1823)      No acute intracranial abnormality  Large calcification posterior to the occiput of unclear etiology  Workstation performed: [id number]                    Procedures  Procedures         ED Course  ED Course as of 05/14/22 2032   Sat May 14, 2022   1720 Patient is a 54-year-old female coming in today complaining of dizziness  On exam she does have dizziness with reproducible symptoms primarily with [place]-Hallpike to the left  Neuro intact no focal deficits  Will start medical workup including CT head and meclizine/Zofran for symptoms      Portions of the record may have been created with voice recognition software  Occasional wrong word or "sound a like" substitutions may have occurred due to the inherent limitations of voice recognition software  Read the chart carefully and recognize, using context, where substitutions have occurred  1812 Patient's labs are stable  Pending CT and troponin   1838 Heart score 3 with a troponin less than 2  CT without acute pathology  There is noted calcification however no acute masses or bleeds  Will trial ambulation   1902 Patient was sleeping  She is updated on CT she does state that she is aware this calcification that is been there for years  Will trial ambulation  Remains neuro intact   1905 Patient ambulated but stated that she still feels foggy  She still has some dizziness but is improved  Will give Valium IV and reassess   1925 Patient refusing Valium  2021 Patient is sitting up vertigo  Well appearing  Long discussion with patient regarding meclizine, Zofran follow-up with ENT  Patient remains neuro intact  Well appearing    Will give meclizine and Zofran for home             HEART Risk Score    Flowsheet Row Most Recent Value   Heart Score Risk Calculator    History 0 Filed at: 05/14/2022 1838   ECG 1 Filed at: 05/14/2022 1838   Age 1 Filed at: 05/14/2022 1838   Risk Factors 1 Filed at: 05/14/2022 1838   Troponin 0 Filed at: 05/14/2022 1838   HEART Score 3 Filed at: 05/14/2022 1838           Stroke Assessment     Row Name 05/14/22 1731             NIH Stroke Scale    Interval Baseline      Level of Consciousness (1a ) 0      LOC Questions (1b ) 0      LOC Commands (1c ) 0      Best Gaze (2 ) 0      Visual (3 ) 0      Facial Palsy (4 ) 0      Motor Arm, Left (5a ) 0      Motor Arm, Right (5b ) 0      Motor Leg, Left (6a ) 0      Motor Leg, Right (6b ) 0      Limb Ataxia (7 ) 0      Sensory (8 ) 0      Best Language (9 ) 0      Dysarthria (10 ) 0      Extinction and Inattention (11 ) (Formerly Neglect) 0      Total 0              Flowsheet Row Most Recent Value   TPA Decision Options    TPA Decision Patient not a TPA candidate  Patient is not a candidate options Symptoms resolved/clearly non disabling  MDM  Number of Diagnoses or Management Options  Diagnosis management comments:     EKG INTERPRETATION @ 1658  RHYTHM:  Normal sinus rhythm at 80 beats per minute  AXIS:  Normal axis  INTERVALS:  PA interval measured at 156 milliseconds  QRS COMPLEX:  QRS measured at 92 milliseconds  ST SEGMENT:  Nonspecific ST segment changes  Diffuse artifact  QT INTERVAL:  QTC measured at 455 milliseconds  COMPARED WITH PRIOR   Deisy Sequin Interpretation by Mdoesto Mas DO        Differential diagnosis includes but not limited to: BPPV, Menière's, labyrinthitis, CVA, TIA, arrhthymias, ADRIA, electrolyte dysfunction, orthostatic hypotension, dehydration, medication reaction, OM, vestibular neuritis            Amount and/or Complexity of Data Reviewed  Clinical lab tests: ordered and reviewed  Tests in the radiology section of CPT®: reviewed and ordered  Tests in the medicine section of CPT®: ordered and reviewed  Review and summarize past medical records: yes  Independent visualization of images, tracings, or specimens: yes        Disposition  Final diagnoses:   Vertigo     Time reflects when diagnosis was documented in both MDM as applicable and the Disposition within this note     Time User Action Codes Description Comment    5/14/2022  5:34 PM Fernando Jett Add [R42] Vertigo       ED Disposition     ED Disposition   Discharge    Condition   Stable    Date/Time   Sat May 14, 2022  8:23 PM    Comment   Ingris Atkinson discharge to home/self care  Follow-up Information     Follow up With Specialties Details Why Contact Info Additional [address] [facility] Family Medicine Schedule an appointment as soon as possible for a visit in 1 week  [address], [address] [facility] [id number]  [address], [address], [address] [place], [place], [address]    Marino Howell MD Family Medicine In 1 week  [address]  [phone] Otolaryngology In 3 week  [facility] Liane Piper Dashawn [address] [address]  [address] Otolaryngology In 1 week  [address] [facility]  [address] [facility]             Patient's Medications   Discharge Prescriptions    MECLIZINE (ANTIVERT) 25 MG TABLET    Take 1 tablet (25 mg total) by mouth as needed in the morning and 1 tablet (25 mg total) as needed at noon and 1 tablet (25 mg total) as needed in the evening for dizziness  Do all this for up to 4 days         Start Date: 5/14/2022 End Date: 5/18/2022       Order Dose: 25 mg       Quantity: 12 tablet    Refills: 0    ONDANSETRON (ZOFRAN ODT) 4 MG DISINTEGRATING TABLET    Take 1 tablet (4 mg total) by mouth every 8 (eight) hours as needed for nausea or vomiting for up to 2 days       Start Date: 5/14/2022 End Date: 5/16/2022       Order Dose: 4 mg       Quantity: 6 tablet    Refills: 0           PDMP Review       Value Time User    PDMP Reviewed  Yes 11/10/2021  9:57 PM Maddie Mcgrath MD          ED Provider  Electronically Signed by           Tracey Palacio DO  05/14/22 2032

## 2022-05-16 LAB
ATRIAL RATE: 82 BPM
P AXIS: 59 DEGREES
PR INTERVAL: 156 MS
QRS AXIS: 16 DEGREES
QRSD INTERVAL: 92 MS
QT INTERVAL: 390 MS
QTC INTERVAL: 455 MS
T WAVE AXIS: 54 DEGREES
VENTRICULAR RATE: 82 BPM

## 2022-05-16 PROCEDURE — 93010 ELECTROCARDIOGRAM REPORT: CPT | Performed by: INTERNAL MEDICINE

## 2022-05-25 ENCOUNTER — TELEPHONE (OUTPATIENT)
Dept: FAMILY MEDICINE CLINIC | Facility: CLINIC | Age: 52
End: 2022-05-25

## 2022-05-25 NOTE — TELEPHONE ENCOUNTER
Pt is transferring out of the practice, scaned in form and sent to [facility] SURGICAL SPECIALTY \[facility]\""

## 2022-08-29 ENCOUNTER — OFFICE VISIT (OUTPATIENT)
Dept: URGENT CARE | Age: 52
End: 2022-08-29
Payer: COMMERCIAL

## 2022-08-29 VITALS — HEART RATE: 98 BPM | RESPIRATION RATE: 16 BRPM | OXYGEN SATURATION: 98 % | TEMPERATURE: 97.1 F

## 2022-08-29 DIAGNOSIS — M79.672 LEFT FOOT PAIN: Primary | ICD-10-CM

## 2022-08-29 PROCEDURE — 99213 OFFICE O/P EST LOW 20 MIN: CPT | Performed by: STUDENT IN AN ORGANIZED HEALTH CARE EDUCATION/TRAINING PROGRAM

## 2022-08-29 RX ORDER — PREDNISONE 10 MG/1
10 TABLET ORAL DAILY
Qty: 21 TABLET | Refills: 0 | Status: SHIPPED | OUTPATIENT
Start: 2022-08-29

## 2022-08-30 NOTE — PROGRESS NOTES
[facility]        NAME: Ct Shea is a 46 y o  female  : 1970    MRN: [id number]  DATE: 2022  TIME: 8:52 PM    Assessment and Plan   Left foot pain [M79 672]  1  Left foot pain  predniSONE 10 mg tablet         Patient Instructions       Follow up with PCP in 3-5 days  Proceed to  ER if symptoms worsen  Chief Complaint     Chief Complaint   Patient presents with    Foot Pain     Left foot pain, no injury or trauma occurred, on top of foot, able to bear weight and walk, intermittent pain, has podiatry appt on Thur, seeking second opinion, for 1 5 years, recently worse for last 6 weeks         History of Present Illness       HPI  Patient presents complaining of left foot pain long time  Patient does not of any injury or trauma  She states she has pain on the top of her foot  She is able to bear weight and walk but has pain  Patient has a podiatry appointment on Thursday this pain has been ongoing for 1/2 years  He has recently been worse for the past 6 weeks  Review of Systems   Review of Systems  For HPI    Current Medications       Current Outpatient Medications:     multivitamin (THERAGRAN) TABS, Take 1 tablet by mouth daily, Disp: , Rfl:     predniSONE 10 mg tablet, Take 1 tablet (10 mg total) by mouth daily 6 tab day 1, 5 tab day 2, 4 tab day 3, 3 tab day 4, 2 tab day 5, 1 tab day 6, Disp: 21 tablet, Rfl: 0    meclizine (ANTIVERT) 25 mg tablet, Take 1 tablet (25 mg total) by mouth as needed in the morning and 1 tablet (25 mg total) as needed at noon and 1 tablet (25 mg total) as needed in the evening for dizziness  Do all this for up to 4 days  , Disp: 12 tablet, Rfl: 0    ondansetron (Zofran ODT) 4 mg disintegrating tablet, Take 1 tablet (4 mg total) by mouth every 8 (eight) hours as needed for nausea or vomiting for up to 2 days, Disp: 6 tablet, Rfl: 0    Current Allergies     Allergies as of 2022 - Reviewed 2022   Allergen Reaction Noted    Clindamycin Rash 11/08/2013    Doxycycline GI Intolerance 11/08/2013            The following portions of the patient's history were reviewed and updated as appropriate: allergies, current medications, past family history, past medical history, past social history, past surgical history and problem list      Past Medical History:   Diagnosis Date    BRCA1 negative     BRCA2 negative     Dizziness     Ear problems     Migraine     Nasal congestion     Tinnitus        Past Surgical History:   Procedure Laterality Date    ENDOMETRIAL ABLATION  2001    LAPAROSCOPIC OVARIAN CYSTECTOMY Left 2001    LIPOMA RESECTION Left 2003    OOPHORECTOMY Bilateral 2019    TUBAL LIGATION  7268    UMBILICAL HERNIA REPAIR  2003    WISDOM TOOTH EXTRACTION  1988       Family History   Problem Relation Age of Onset    Ovarian cancer Mother 72    Glaucoma Mother     Cancer Mother     Skin cancer Mother         80's-multiple skin ca    Lung cancer Mother         Late [de-identified]    Coronary artery disease Father     Diabetes Father     Diabetes Brother     Diabetes Brother     Cirrhosis Brother     Alcohol abuse Brother     No Known Problems Daughter     No Known Problems Maternal Grandmother     No Known Problems Maternal Grandfather     No Known Problems Paternal Grandmother     No Known Problems Paternal Grandfather     No Known Problems Maternal Aunt     No Known Problems Maternal Aunt     No Known Problems Paternal Aunt     No Known Problems Paternal Aunt     No Known Problems Paternal Aunt     Colon cancer Neg Hx     Rectal cancer Neg Hx     Colon polyps Neg Hx          Medications have been verified  Objective   Pulse 98   Temp (!) 97 1 °F (36 2 °C)   Resp 16   SpO2 98%   No LMP recorded  (Menstrual status: Oopherectomy)  Physical Exam     Physical Exam  Constitutional:       General: She is not in acute distress  Appearance: Normal appearance  HENT:      Head: Normocephalic        Nose: No congestion or rhinorrhea  Mouth/Throat:      Mouth: Mucous membranes are moist       Pharynx: No oropharyngeal exudate or posterior oropharyngeal erythema  Eyes:      General:         Right eye: No discharge  Left eye: No discharge  Conjunctiva/sclera: Conjunctivae normal    Cardiovascular:      Rate and Rhythm: Normal rate and regular rhythm  Pulses: Normal pulses  Pulmonary:      Effort: Pulmonary effort is normal  No respiratory distress  Abdominal:      General: Abdomen is flat  There is no distension  Palpations: Abdomen is soft  Tenderness: There is no abdominal tenderness  Musculoskeletal:         General: Tenderness present  Cervical back: Neck supple  Comments: Tenderness to palpation over lateral dorsum of the foot left   Skin:     General: Skin is warm  Capillary Refill: Capillary refill takes less than 2 seconds  Neurological:      Mental Status: She is alert and oriented to person, place, and time

## 2022-09-01 ENCOUNTER — HOSPITAL ENCOUNTER (OUTPATIENT)
Dept: RADIOLOGY | Facility: HOSPITAL | Age: 52
Discharge: HOME | End: 2022-09-01
Attending: PODIATRIST
Payer: COMMERCIAL

## 2022-09-01 DIAGNOSIS — M79.672 LEFT FOOT PAIN: ICD-10-CM

## 2022-09-01 PROCEDURE — 73630 X-RAY EXAM OF FOOT: CPT

## 2022-11-15 DIAGNOSIS — M76.72 PERONEAL TENDONITIS, LEFT: Primary | ICD-10-CM

## 2022-11-15 RX ORDER — MELOXICAM 15 MG/1
15 TABLET ORAL DAILY
Qty: 30 TABLET | Refills: 1 | Status: SHIPPED | OUTPATIENT
Start: 2022-11-15 | End: 2023-01-14

## 2022-11-26 ENCOUNTER — HOSPITAL ENCOUNTER (EMERGENCY)
Facility: HOSPITAL | Age: 52
Discharge: HOME | End: 2022-11-27
Attending: EMERGENCY MEDICINE

## 2022-11-26 DIAGNOSIS — S61.412A LACERATION OF LEFT HAND: Primary | ICD-10-CM

## 2022-11-27 VITALS
DIASTOLIC BLOOD PRESSURE: 70 MMHG | WEIGHT: 180 LBS | HEART RATE: 76 BPM | RESPIRATION RATE: 16 BRPM | OXYGEN SATURATION: 99 % | HEIGHT: 64 IN | BODY MASS INDEX: 30.73 KG/M2 | SYSTOLIC BLOOD PRESSURE: 130 MMHG | TEMPERATURE: 98.2 F

## 2022-11-27 RX ORDER — GINSENG 100 MG
1 CAPSULE ORAL ONCE
Status: COMPLETED | OUTPATIENT
Start: 2022-11-27 | End: 2022-11-27

## 2022-11-27 RX ORDER — LIDOCAINE HYDROCHLORIDE AND EPINEPHRINE 10; 10 MG/ML; UG/ML
1 INJECTION, SOLUTION INFILTRATION; PERINEURAL ONCE
Status: COMPLETED | OUTPATIENT
Start: 2022-11-27 | End: 2022-11-27

## 2022-11-27 RX ADMIN — BACITRACIN 1 SMALL APPLICATION: 500 OINTMENT TOPICAL at 00:46

## 2022-11-27 RX ADMIN — LIDOCAINE HYDROCHLORIDE,EPINEPHRINE BITARTRATE 1 ML: 10; .01 INJECTION, SOLUTION INFILTRATION; PERINEURAL at 00:46

## 2022-11-27 NOTE — ED ATTENDING ATTESTATION
11/26/2022  ITerri MD, saw and evaluated the patient  I have discussed the patient with the resident/non-physician practitioner and agree with the resident's/non-physician practitioner's findings, Plan of Care, and MDM as documented in the resident's/non-physician practitioner's note, except where noted  All available labs and Radiology studies were reviewed  I was present for key portions of any procedure(s) performed by the resident/non-physician practitioner and I was immediately available to provide assistance  At this point I agree with the current assessment done in the Emergency Department  I have conducted an independent evaluation of this patient a history and physical is as follows:    51-year-old female presented for evaluation of laceration on the knuckle of the left index finger, approximately 1 cm  No tendon involvement  Occurred while she was opening a can pumpkin filling this evening  Tetanus up-to-date  Plan local anesthetic, irrigation, closure with sutures        ED Course         Critical Care Time  Procedures

## 2022-11-27 NOTE — ED PROVIDER NOTES
History  Chief Complaint   Patient presents with   • Hand Laceration     Pt cut top of left hand on side of can  Bleeding controlled and sensation in tact  Patient is a 22-year-old female with past medical history of migraine, dizziness who came to the ED after she sustained a laceration on the left knuckle of the left hand  Patient reported that she was scraping  pumpkin from a can to make pumpkin pie when the lid the can accidentally slash her left knuckle  Patient reported that she was able to control the bleeding, sensation in the left index as well as the rest of her fingers are intact  Patient is able to move all her fingers without any problem  Prior to Admission Medications   Prescriptions Last Dose Informant Patient Reported? Taking?   meclizine (ANTIVERT) 25 mg tablet   No No   Sig: Take 1 tablet (25 mg total) by mouth as needed in the morning and 1 tablet (25 mg total) as needed at noon and 1 tablet (25 mg total) as needed in the evening for dizziness  Do all this for up to 4 days     meloxicam (Mobic) 15 mg tablet   No No   Sig: Take 1 tablet (15 mg total) by mouth daily   multivitamin (THERAGRAN) TABS   Yes No   Sig: Take 1 tablet by mouth daily   ondansetron (Zofran ODT) 4 mg disintegrating tablet   No No   Sig: Take 1 tablet (4 mg total) by mouth every 8 (eight) hours as needed for nausea or vomiting for up to 2 days   predniSONE 10 mg tablet   No No   Sig: Take 1 tablet (10 mg total) by mouth daily 6 tab day 1, 5 tab day 2, 4 tab day 3, 3 tab day 4, 2 tab day 5, 1 tab day 6      Facility-Administered Medications: None       Past Medical History:   Diagnosis Date   • BRCA1 negative    • BRCA2 negative    • Dizziness    • Ear problems    • Migraine    • Nasal congestion    • Tinnitus        Past Surgical History:   Procedure Laterality Date   • ENDOMETRIAL ABLATION  2001   • LAPAROSCOPIC OVARIAN CYSTECTOMY Left 2001   • LIPOMA RESECTION Left 2003   • OOPHORECTOMY Bilateral 2019   • TUBAL LIGATION  4879   • UMBILICAL HERNIA REPAIR  2003   • WISDOM TOOTH EXTRACTION  1988       Family History   Problem Relation Age of Onset   • Ovarian cancer Mother 72   • Glaucoma Mother    • Cancer Mother    • Skin cancer Mother         [de-identified] skin ca   • Lung cancer Mother         Late [de-identified]   • Coronary artery disease Father    • Diabetes Father    • Diabetes Brother    • Diabetes Brother    • Cirrhosis Brother    • Alcohol abuse Brother    • No Known Problems Daughter    • No Known Problems Maternal Grandmother    • No Known Problems Maternal Grandfather    • No Known Problems Paternal Grandmother    • No Known Problems Paternal Grandfather    • No Known Problems Maternal Aunt    • No Known Problems Maternal Aunt    • No Known Problems Paternal Aunt    • No Known Problems Paternal Aunt    • No Known Problems Paternal Aunt    • Colon cancer Neg Hx    • Rectal cancer Neg Hx    • Colon polyps Neg Hx      I have reviewed and agree with the history as documented  E-Cigarette/Vaping   • E-Cigarette Use Never User      E-Cigarette/Vaping Substances   • Nicotine No    • THC No    • CBD No    • Flavoring No    • Other No    • Unknown No      Social History     Tobacco Use   • Smoking status: Never   • Smokeless tobacco: Never   Vaping Use   • Vaping Use: Never used   Substance Use Topics   • Alcohol use: No   • Drug use: Never        Review of Systems   Constitutional: Negative for chills and fever  HENT: Negative for ear pain and sore throat  Eyes: Negative for pain and visual disturbance  Respiratory: Negative for cough and shortness of breath  Cardiovascular: Negative for chest pain and palpitations  Gastrointestinal: Negative for abdominal pain and vomiting  Genitourinary: Negative for dysuria and hematuria  Musculoskeletal: Negative for arthralgias and back pain  Skin: Negative for color change and rash  Neurological: Negative for seizures and syncope     All other systems reviewed and are negative  Physical Exam  ED Triage Vitals   Temperature Pulse Respirations Blood Pressure SpO2   11/26/22 2202 11/26/22 2202 11/26/22 2202 11/26/22 2202 11/26/22 2202   98 2 °F (36 8 °C) 78 20 133/71 99 %      Temp Source Heart Rate Source Patient Position - Orthostatic VS BP Location FiO2 (%)   11/26/22 2202 11/26/22 2202 11/26/22 2202 11/26/22 2202 --   Oral Monitor Sitting Right arm       Pain Score       11/26/22 2345       1             Orthostatic Vital Signs  Vitals:    11/26/22 2202 11/26/22 2345 11/27/22 0110   BP: 133/71 130/70    Pulse: 78 70 76   Patient Position - Orthostatic VS: Sitting Sitting        Physical Exam  Vitals and nursing note reviewed  Constitutional:       General: She is not in acute distress  Appearance: She is well-developed  HENT:      Head: Normocephalic and atraumatic  Eyes:      Conjunctiva/sclera: Conjunctivae normal    Cardiovascular:      Rate and Rhythm: Normal rate and regular rhythm  Heart sounds: No murmur heard  Pulmonary:      Effort: Pulmonary effort is normal  No respiratory distress  Breath sounds: Normal breath sounds  Abdominal:      Palpations: Abdomen is soft  Tenderness: There is no abdominal tenderness  Musculoskeletal:         General: No swelling  Left hand: Laceration (Left knuckle laceration about 2 cm ) present  Arms:       Cervical back: Neck supple  Skin:     General: Skin is warm and dry  Capillary Refill: Capillary refill takes less than 2 seconds  Neurological:      Mental Status: She is alert     Psychiatric:         Mood and Affect: Mood normal          ED Medications  Medications   lidocaine-epinephrine (XYLOCAINE/EPINEPHRINE) 1 %-1:100,000 injection 1 mL (1 mL Infiltration Given 11/27/22 0046)   bacitracin topical ointment 1 small application (1 small application Topical Given 11/27/22 0046)       Diagnostic Studies  Results Reviewed     None                 No orders to display Procedures  Laceration repair    Date/Time: 11/27/2022 4:37 AM  Performed by: Ramses Villar MD  Authorized by: Ramses Villar MD   Consent: Verbal consent obtained  Risks and benefits: risks, benefits and alternatives were discussed  Consent given by: patient  Patient identity confirmed: verbally with patient  Body area: upper extremity  Location details: left index finger  Laceration length: 2 cm  Foreign bodies: no foreign bodies  Tendon involvement: none  Nerve involvement: none  Vascular damage: no    Anesthesia:  Local Anesthetic: lidocaine 1% with epinephrine    Sedation:  Patient sedated: no      Wound Dehiscence:  Superficial Wound Dehiscence: simple closure      Procedure Details:  Skin closure: 4-0 nylon  Number of sutures: 2  Technique: simple  Approximation: close  Approximation difficulty: simple  Patient tolerance: patient tolerated the procedure well with no immediate complications            ED Course                             SBIRT 22yo+    Flowsheet Row Most Recent Value   SBIRT (25 yo +)    In order to provide better care to our patients, we are screening all of our patients for alcohol and drug use  Would it be okay to ask you these screening questions? Unable to answer at this time Filed at: 11/26/2022 2343                [facility]  Number of Diagnoses or Management Options  Laceration of left hand  Diagnosis management comments: Patient presented after sustaining a laceration on the knuckle of the left index finger  Patient is up-to-date with her tetanus immunization  There is  approximately about 2 cm laceration noticed  Bleeding controled prior to arrival to the emergency department  Simple  suture was placed with application of small amount of bacitracin and covered with tape  Patient tolerated procedure very well   Sensation intact in all fingers after procedures  Patient was discharged from the emergency room    Return precaution discussed  Disposition  Final diagnoses:   Laceration of left hand     Time reflects when diagnosis was documented in both MDM as applicable and the Disposition within this note     Time User Action Codes Description Comment    11/27/2022  1:10 AM Izaiah Santillan Add [Y76 912I] Laceration of left hand       ED Disposition     ED Disposition   Discharge    Condition   Stable    Date/Time   Sun Nov 27, 2022  1:09 AM    Comment   Elma Reddy discharge to home/self care  Follow-up Information    None         Discharge Medication List as of 11/27/2022  1:13 AM      CONTINUE these medications which have NOT CHANGED    Details   meclizine (ANTIVERT) 25 mg tablet Take 1 tablet (25 mg total) by mouth as needed in the morning and 1 tablet (25 mg total) as needed at noon and 1 tablet (25 mg total) as needed in the evening for dizziness  Do all this for up to 4 days  , Starting Sat 5/14/2022, Until Wed 5/18/2022 at 23 59, Normal      meloxicam (Mobic) 15 mg tablet Take 1 tablet (15 mg total) by mouth daily, Starting Tue 11/15/2022, Until Sat 1/14/2023, Normal      multivitamin (THERAGRAN) TABS Take 1 tablet by mouth daily, Historical Med      ondansetron (Zofran ODT) 4 mg disintegrating tablet Take 1 tablet (4 mg total) by mouth every 8 (eight) hours as needed for nausea or vomiting for up to 2 days, Starting Sat 5/14/2022, Until Mon 5/16/2022 at 2359, Normal      predniSONE 10 mg tablet Take 1 tablet (10 mg total) by mouth daily 6 tab day 1, 5 tab day 2, 4 tab day 3, 3 tab day 4, 2 tab day 5, 1 tab day 6, Starting Mon 8/29/2022, Normal           No discharge procedures on file  PDMP Review       Value Time User    PDMP Reviewed  Yes 11/10/2021  9:57 PM Malka Nieves MD           ED Provider  Attending physically available and evaluated Elma Reddy I managed the patient along with the ED Attending      Electronically Signed by         Wesly Johnson MD  11/27/22 6529

## 2022-11-27 NOTE — DISCHARGE INSTRUCTIONS
Please keep area clean, apply small amount of bacitracin/Neosporin to the area with a Band-Aid  Return sooner to the ED if you experience severe pain, hand swelling, fever as these may be sign of infection  Follow-up with your PCP or come back to the ED for removal of suture in 7 to 10 days

## 2022-11-30 ENCOUNTER — EVALUATION (OUTPATIENT)
Dept: PHYSICAL THERAPY | Facility: CLINIC | Age: 52
End: 2022-11-30

## 2022-11-30 DIAGNOSIS — M76.72 PERONEAL TENDONITIS, LEFT: Primary | ICD-10-CM

## 2022-11-30 DIAGNOSIS — M62.81 MUSCLE WEAKNESS (GENERALIZED): ICD-10-CM

## 2022-11-30 DIAGNOSIS — M79.672 LEFT FOOT PAIN: ICD-10-CM

## 2022-11-30 NOTE — PROGRESS NOTES
PT Evaluation     Today's date: 2022  Patient name: Joe Cuevas  : 1970  MRN: [id number]  Referring provider: Benjamin Noble DPM  Dx:   Encounter Diagnosis     ICD-10-CM    1  Peroneal tendonitis, left  M76 72 Ambulatory Referral to Physical Therapy      2  Left foot pain  M79 672       3  Muscle weakness (generalized)  M62 81             Assessment  Assessment details: Joe Cuevas is a 46 y o  female presenting to outpatient physical therapy at [facility] with complaints of chronic progressive L foot pain  She presents with decreased range of motion, decreased strength, limited flexibility, poor postural awareness, poor body mechanics, altered gait pattern, poor balance, decreased tolerance to activity and decreased functional mobility due to Peroneal tendonitis, left  (primary encounter diagnosis), Left foot pain, and Muscle weakness (generalized)  Therapist discussed diagnosis, prognosis, plan of care, proper responses to exercises, HEP, and modalities to use at home  Xochilt Hoffman would benefit from skilled PT services in order to address these deficits and reach maximum level of function   Thank you for the referral!  Impairments: abnormal coordination, abnormal gait, abnormal muscle firing, abnormal muscle tone, abnormal or restricted ROM, abnormal movement, activity intolerance, impaired balance, impaired physical strength, lacks appropriate home exercise program, pain with function, poor posture  and poor body mechanics    Symptom irritability: moderateUnderstanding of Dx/Px/POC: good   Prognosis: good  Prognosis details: STGs (in 4 weeks):  1  Pt will report having at least a 50% improvement since I E    2  Pt will report having at most a 2/10 pain level with functional mobility  3  Pt will have full AROM of L foot in all planes without onset of sxs  LTGs (in 12 weeks):  1  Pt will report having at least a 75% improvement since I E    2  Pt will be independent with HEP     3  Pt will be able to amb over even and uneven terrain with symmetrical gait pattern without onset of sxs  Plan  Patient would benefit from: skilled physical therapy  Planned modality interventions: TENS, ultrasound, unattended electrical stimulation and low level laser therapy  Planned therapy interventions: joint mobilization, manual therapy, neuromuscular re-education, patient education, self care, strengthening, stretching, therapeutic activities, therapeutic exercise, home exercise program, gait training and balance  Frequency: 2x week  Plan of Care beginning date: 2022  Plan of Care expiration date: 2023  Treatment plan discussed with: patient        Subjective Evaluation    History of Present Illness  Mechanism of injury: Pt is a 46year old female who presents with chronic L foot pain that exacerbates while working long days at [employer] [address] [employer] in the summer months  She reports that her pain has been improving with not being on her feet as much  She is actively being seen by podiatry which has helped give her relief  Now referred to skilled OP PT  Quality of life: good    Pain  Current pain ratin  At best pain ratin  At worst pain rating: 10  Quality: tight, pulling and discomfort (tenderness)  Relieving factors: relaxation, rest and medications  Aggravating factors: walking and standing (amb over uneven terrain)  Progression: improved    Treatments  Treatments tried: Podiatry    Patient Goals  Patient goals for therapy: decreased pain, improved balance, increased motion, increased strength, independence with ADLs/IADLs and return to sport/leisure activities          Objective     Palpation: TTPR cuboid bone (dorsally)    AROM: (measured in degrees); *=pain       R  L  Ankle DF    15/20  10/15  Ankle PF    55/60  50/52  Ankle inversion  30/35  28*/30  Ankle eversion   20/25  10/15    STRENGTH:    R  L    Ankle DF   4+/5  3-/5  Ankle PF   4+/5  3-/5  Ankle inversion  4+/5  3-/5  Ankle eversoin   4+/5  3-/5    Knee ext   4/5  4/5  Knee flex   4/5  4/5  Hip abduction   4-/5  3+/5  Hip extension   4-/5  3/5    Observation: Edema: none observed    Pt's ambulation demonstrates with decreased heel strike with antalgic gait pattern and decreased AROM DF in mid stance         Balance:  R SLS w/ EO: TBA  L SLS w/ EO: TBA  R SLS w/ EC: TBA  L SLS w/ EC: TBA    Function:  Squat: increased anterior translation   Step up/down: TBA      Precautions: standard    HEP: towel crunches    Specialty Daily Treatment Diary       Manual 11/30       STM/TPR dorsal and plantar aspect of L foot along peroneals         L ankle distraction        Mid foot A/P glides        L GT distraction                Exercise Diary         RB                HR (B) -> Half foam HR (B)        Up on two lower on RLE                Airex Tandem        Airex SLS                Standing Arch Raises                        Slant board gastroc-soleus stretch                        Seated HS stretch        Toe Crunches        Ball Pickup                Toe Extension stretch                Seated Fitterboard circular (A/P, M/L, circles cw/ccw) -> Standing                        TB A' DF/PF        TB A' Inv/Ev                        Foam roller calves                        TB Bridges        TB Clamshells                Sidelying H' ABD                Prone quad stretch c SOS        Supine HS stretch                HEP/EDU Diagnosis, prognosis, POC, proper responses to exercises, HEP       Modalities                        US        Laser                                   Skin checks performed pre and post application: intact

## 2022-12-06 ENCOUNTER — OFFICE VISIT (OUTPATIENT)
Dept: PHYSICAL THERAPY | Facility: CLINIC | Age: 52
End: 2022-12-06

## 2022-12-06 DIAGNOSIS — M62.81 MUSCLE WEAKNESS (GENERALIZED): ICD-10-CM

## 2022-12-06 DIAGNOSIS — M79.672 LEFT FOOT PAIN: ICD-10-CM

## 2022-12-06 DIAGNOSIS — M76.72 PERONEAL TENDONITIS, LEFT: Primary | ICD-10-CM

## 2022-12-06 NOTE — PROGRESS NOTES
Daily Note     Today's date: 2022  Patient name: Renate Kelly  : 1970  MRN: [id number]  Referring provider: Jim Dent DPM  Dx:   Encounter Diagnosis     ICD-10-CM    1  Peroneal tendonitis, left  M76 72       2  Left foot pain  M79 672       3  Muscle weakness (generalized)  M62 81                      Subjective: Patient states she is doing alright today  She denies a lot of pain  She reports she isnt working right now  Objective: See treatment diary below      Assessment: Tolerated treatment well  Initiated POC in seated with fitterboard to work on ROM, she did fatigue with this  She responded well to all TE but did present with weakness  Continue to progress next visit  Patient demonstrated fatigue post treatment, exhibited good technique with therapeutic exercises and would benefit from continued PT      Plan: Continue per plan of care        Precautions: standard    HEP: towel crunches    Specialty Daily Treatment Diary       Manual        STM/TPR dorsal and plantar aspect of L foot along peroneals         L ankle distraction        Mid foot A/P glides        L GT distraction                Exercise Diary         RB                HR (B) -> Half foam HR (B)  20x       Up on two lower on RLE                Airex Tandem        Airex SLS                Standing Arch Raises  5 sec x 15                       Slant board gastroc-soleus stretch  20 sec x 3                       Seated HS stretch        Toe Crunches  2 min      [employer] Pickup                Toe Extension stretch                Seated Fitterboard circular (A/P, M/L, circles cw/ccw) -> Standing  20x ea direction                       TB A' DF/PF        TB A' Inv/Ev                        Foam roller calves                        TB Bridges        TB Clamshells                Sidelying H' ABD                Prone quad stretch c SOS        Supine HS stretch                HEP/EDU Diagnosis, prognosis, POC, proper responses to exercises, HEP       Modalities        MH  5 min post               US        Laser                                   Skin checks performed pre and post application: intact

## 2022-12-08 ENCOUNTER — OFFICE VISIT (OUTPATIENT)
Dept: PHYSICAL THERAPY | Facility: CLINIC | Age: 52
End: 2022-12-08

## 2022-12-08 DIAGNOSIS — M62.81 MUSCLE WEAKNESS (GENERALIZED): ICD-10-CM

## 2022-12-08 DIAGNOSIS — M76.72 PERONEAL TENDONITIS, LEFT: Primary | ICD-10-CM

## 2022-12-08 DIAGNOSIS — M79.672 LEFT FOOT PAIN: ICD-10-CM

## 2022-12-08 NOTE — PROGRESS NOTES
Daily Note     Today's date: 2022  Patient name: Fly Varela  : 1970  MRN: [id number]  Referring provider: Ely Fine DPM  Dx:   Encounter Diagnosis     ICD-10-CM    1  Peroneal tendonitis, left  M76 72       2  Left foot pain  M79 672       3  Muscle weakness (generalized)  M62 81                      Subjective: Patient reports that she is more symptomatic today  Objective: See treatment diary below      Assessment: Tolerated treatment well; initiated POC with MH in sitting for tissue prep  VCs provided on proper sequencing with exercises; added toe yoga and dowel roll  She reports having some minor discomfort throughout session  MT performed after receiving consent from pt; she demonstrates increased tone to mid tib anterior and peroneals which improves post TPR  Instructed on modalities to use at home  Patient demonstrated fatigue post treatment and would benefit from continued PT      Plan: Continue per plan of care        Precautions: standard    HEP: towel crunches    Specialty Daily Treatment Diary       Manual      STM/TPR dorsal and plantar aspect of L foot along peroneals    SMF     L ankle distraction   SMF     Mid foot A/P glides   SMF     L GT distraction   SMF     L PROM all planes   SMF                     Exercise Diary         RB                HR (B) -> Half foam HR (B)  20x  20     Up on two lower on RLE                Airex Tandem        Airex SLS                Standing Arch Raises  5 sec x 15                       Slant board gastroc-soleus stretch  20 sec x 3  20 sec x (gastroc only)                      Seated HS stretch        Toe Crunches  2 min 2 mins     [place] Pickup        Toe Yoga (big toe up and romain down; big toe down and romain up)   2 sec x 10 ea     Dowel roll   2 mins     Seated PF stretch                Toe Extension stretch                Seated Fitterboard circular (A/P, M/L, circles cw/ccw) -> Standing  20x ea direction  20 ea direction                     TB A' DF/PF        TB A' Inv/Ev                        Foam roller calves                        TB Bridges        TB Clamshells                Sidelying H' ABD                Prone quad stretch c SOS        Supine HS stretch                HEP/EDU Diagnosis, prognosis, POC, proper responses to exercises, HEP       Modalities        MH  5 min post  8 mins pre             US        Laser                                   Skin checks performed pre and post application: intact

## 2022-12-09 ENCOUNTER — APPOINTMENT (OUTPATIENT)
Dept: PHYSICAL THERAPY | Facility: CLINIC | Age: 52
End: 2022-12-09

## 2022-12-13 ENCOUNTER — OFFICE VISIT (OUTPATIENT)
Dept: PHYSICAL THERAPY | Facility: CLINIC | Age: 52
End: 2022-12-13

## 2022-12-13 DIAGNOSIS — M76.72 PERONEAL TENDONITIS, LEFT: Primary | ICD-10-CM

## 2022-12-13 DIAGNOSIS — M79.672 LEFT FOOT PAIN: ICD-10-CM

## 2022-12-13 DIAGNOSIS — M62.81 MUSCLE WEAKNESS (GENERALIZED): ICD-10-CM

## 2022-12-13 NOTE — PROGRESS NOTES
Daily Note     Today's date: 2022  Patient name: Ricci Richard  : 1970  MRN: [id number]  Referring provider: Maliha Perez DPM  Dx:   Encounter Diagnosis     ICD-10-CM    1  Peroneal tendonitis, left  M76 72       2  Left foot pain  M79 672       3  Muscle weakness (generalized)  M62 81                      Subjective: Patient reports that she woke up the other morning and when she started walking she had increased sxs causing decreased ability to weight bear  Objective: See treatment diary below      Assessment: Tolerated treatment well; initiated POC with MH to L ankle in sitting for tissue prep  VCs provided on proper sequencing with exercises  MT performed after receiving consent from pt; tried IASTM to tib anterior and posterior calf with gastroc-soleus and Achilles  She reports having tightness in joints that feels that it needs to pop  Encouraged pt to continue to stretch at home  Patient demonstrated fatigue post treatment and would benefit from continued PT      Plan: Continue per plan of care        Precautions: standard    HEP: towel crunches    Specialty Daily Treatment Diary       Manual     STM/TPR dorsal and plantar aspect of L foot along peroneals    SMF SMF    L ankle distraction   SMF SMF    Mid foot A/P glides   SMF SMF    L GT distraction   SMF SMF    L PROM all planes   SMF SMF    ISATM to Tib ant and calf near Achilles    SMF            Exercise Diary         RB                HR (B) -> Half foam HR (B)  20x  20     Up on two lower on RLE                Airex Tandem        Airex SLS                Standing Arch Raises  5 sec x 15                       Slant board gastroc-soleus stretch  20 sec x 3  20 sec x (gastroc only)  20 sec x 3 (gastroc only)                    Seated HS stretch        Toe Crunches  2 min 2 mins 2 mins    [place] Pickup        Toe Yoga (big toe up and romain down; big toe down and romain up)   2 sec x 10 ea 2 sec x 10 ea Dowel roll   2 mins 2 mins    Seated PF stretch                Toe Extension stretch                Seated Fitterboard circular (A/P, M/L, circles cw/ccw) -> Standing  20x ea direction  20 ea direction L3: 20 ea direction                    TB A' DF/PF        TB A' Inv/Ev                        Foam roller calves                        TB Bridges        TB Clamshells                Sidelying H' ABD                Prone quad stretch c SOS        Supine HS stretch                HEP/EDU Diagnosis, prognosis, POC, proper responses to exercises, HEP       Modalities        MH  5 min post  8 mins pre 5 mins pre            US        Laser                                   Skin checks performed pre and post application: intact

## 2022-12-16 ENCOUNTER — OFFICE VISIT (OUTPATIENT)
Dept: PHYSICAL THERAPY | Facility: CLINIC | Age: 52
End: 2022-12-16

## 2022-12-16 DIAGNOSIS — M62.81 MUSCLE WEAKNESS (GENERALIZED): ICD-10-CM

## 2022-12-16 DIAGNOSIS — M76.72 PERONEAL TENDONITIS, LEFT: Primary | ICD-10-CM

## 2022-12-16 DIAGNOSIS — M79.672 LEFT FOOT PAIN: ICD-10-CM

## 2022-12-16 NOTE — PROGRESS NOTES
Daily Note     Today's date: 2022  Patient name: Renate Kelly  : 1970  MRN: [id number]  Referring provider: Jim Dent DPM  Dx:   Encounter Diagnosis     ICD-10-CM    1  Peroneal tendonitis, left  M76 72       2  Left foot pain  M79 672       3  Muscle weakness (generalized)  M62 81                      Subjective: Patient states she is feeling much better than last visit  Objective: See treatment diary below      Assessment: Tolerated treatment well  initiated POC in seated on fitterboard  Added in theraband 4 way to further improve strength, she demonstrated good tolerance  Presented with less muscular tightness into lateral foot  Continue to progress as able  Patient demonstrated fatigue post treatment, exhibited good technique with therapeutic exercises and would benefit from continued PT      Plan: Continue per plan of care        Precautions: standard     HEP: towel crunches    Specialty Daily Treatment Diary       Manual    STM/TPR dorsal and plantar aspect of L foot along peroneals    SMF SMF RA   L ankle distraction   SMF SMF RA   Mid foot A/P glides   SMF SMF    L GT distraction   SMF SMF RA   L PROM all planes   SMF SMF RA   ISATM to Tib ant and calf near Achilles    SMF            Exercise Diary         RB                HR (B) -> Half foam HR (B)  20x  20     Up on two lower on RLE                Airex Tandem        Airex SLS                Standing Arch Raises  5 sec x 15                       Slant board gastroc-soleus stretch  20 sec x 3  20 sec x (gastroc only)  20 sec x 3 (gastroc only) 20 sec x 3                    Seated HS stretch        Toe Crunches  2 min 2 mins 2 mins 2 mins    [place] Pickup        Toe Yoga (big toe up and romain down; big toe down and romain up)   2 sec x 10 ea 2 sec x 10 ea 2 sec x 10    Dowel roll   2 mins 2 mins 2 mins    Seated PF stretch                Toe Extension stretch                Seated Fitterboard circular (A/P, M/L, circles cw/ccw) -> Standing  20x ea direction  20 ea direction L3: 20 ea direction L3: 20 ea direction                   TB A' DF/PF     OTB 15x   TB A' Inv/Ev     OTB 15x                   Foam roller calves                        TB Bridges        TB Clamshells                Sidelying H' ABD                Prone quad stretch c SOS        Supine HS stretch                HEP/EDU Diagnosis, prognosis, POC, proper responses to exercises, HEP       Modalities        MH  5 min post  8 mins pre 5 mins pre            US        Laser                                   Skin checks performed pre and post application: intact

## 2022-12-20 ENCOUNTER — OFFICE VISIT (OUTPATIENT)
Dept: PHYSICAL THERAPY | Facility: CLINIC | Age: 52
End: 2022-12-20

## 2022-12-20 DIAGNOSIS — M62.81 MUSCLE WEAKNESS (GENERALIZED): ICD-10-CM

## 2022-12-20 DIAGNOSIS — M79.672 LEFT FOOT PAIN: ICD-10-CM

## 2022-12-20 DIAGNOSIS — M76.72 PERONEAL TENDONITIS, LEFT: Primary | ICD-10-CM

## 2022-12-22 ENCOUNTER — OFFICE VISIT (OUTPATIENT)
Dept: PHYSICAL THERAPY | Facility: CLINIC | Age: 52
End: 2022-12-22

## 2022-12-22 DIAGNOSIS — M76.72 PERONEAL TENDONITIS, LEFT: Primary | ICD-10-CM

## 2022-12-22 DIAGNOSIS — M79.672 LEFT FOOT PAIN: ICD-10-CM

## 2022-12-22 DIAGNOSIS — M62.81 MUSCLE WEAKNESS (GENERALIZED): ICD-10-CM

## 2022-12-22 NOTE — PROGRESS NOTES
Daily Note     Today's date: 2022  Patient name: Megan Lambert  : 1970  MRN: [id number]  Referring provider: Rohini Garay DPM  Dx:   Encounter Diagnosis     ICD-10-CM    1  Peroneal tendonitis, left  M76 72       2  Left foot pain  M79 672       3  Muscle weakness (generalized)  M62 81                      Subjective: Patient states she is doing well  Objective: See treatment diary below      Assessment: Tolerated treatment well  Initiated POC UBE for active warmup  Added in balance to further improve stability of ankle  Continue to add in additional exercises as able  Patient demonstrated fatigue post treatment, exhibited good technique with therapeutic exercises and would benefit from continued PT      Plan: Continue per plan of care        Precautions: standard     HEP: towel crunches    Specialty Daily Treatment Diary       Manual    STM/TPR dorsal and plantar aspect of L foot along peroneals    SMF SMF RA   L ankle distraction RA  SMF SMF RA   Mid foot A/P glides   SMF SMF    L GT distraction RA  SMF SMF RA   L PROM all planes RA  SMF SMF RA   ISATM to Tib ant and calf near Achilles    SMF            Exercise Diary         RB 5 min     5 min            HR (B) -> Half foam HR (B) 20x  20     Up on two lower on RLE                Airex Tandem 20 sec x 2        Airex SLS 20 sec x 2                Standing Arch Raises                        Slant board gastroc-soleus stretch 20 sec x 3   20 sec x (gastroc only)  20 sec x 3 (gastroc only) 20 sec x 3                    Seated HS stretch        Toe Crunches 2 mins  2 mins 2 mins 2 mins    [place] Pickup        Toe Yoga (big toe up and romain down; big toe down and romain up) 2 sec x 10   2 sec x 10 ea 2 sec x 10 ea 2 sec x 10    Dowel roll 2 mins   2 mins 2 mins 2 mins    Seated PF stretch                Toe Extension stretch                Seated Fitterboard circular (A/P, M/L, circles cw/ccw) -> Standing L3 20 ea   20 ea direction L3: 20 ea direction L3: 20 ea direction                   TB A' DF/PF OTB 20x    OTB 15x   TB A' Inv/Ev OTB 20x     OTB 15x                   Foam roller calves                        TB Bridges        TB Clamshells                Sidelying H' ABD                Prone quad stretch c SOS        Supine HS stretch                HEP/EDU        Modalities        MH   8 mins pre 5 mins pre            US        Laser                                   Skin checks performed pre and post application: intact

## 2022-12-27 ENCOUNTER — OFFICE VISIT (OUTPATIENT)
Dept: PHYSICAL THERAPY | Facility: CLINIC | Age: 52
End: 2022-12-27

## 2022-12-27 DIAGNOSIS — M79.672 LEFT FOOT PAIN: ICD-10-CM

## 2022-12-27 DIAGNOSIS — M76.72 PERONEAL TENDONITIS, LEFT: Primary | ICD-10-CM

## 2022-12-27 DIAGNOSIS — M62.81 MUSCLE WEAKNESS (GENERALIZED): ICD-10-CM

## 2022-12-27 NOTE — PROGRESS NOTES
Daily Note     Today's date: 2022  Patient name: Judi Sahu  : 1970  MRN: [id number]  Referring provider: Brett Rosales DPM  Dx:   Encounter Diagnosis     ICD-10-CM    1  Peroneal tendonitis, left  M76 72       2  Left foot pain  M79 672       3  Muscle weakness (generalized)  M62 81                       Subjective: Patient states she was in the bathroom and pushed a rug and felt some pain into her lateral foot  Objective: See treatment diary below      Assessment: Tolerated treatment well  Initaited POC on RB for active warmup  Resumed with prescribed POC as able  She noted she felt relief following PT session  Patient demonstrated fatigue post treatment, exhibited good technique with therapeutic exercises and would benefit from continued PT      Plan: Continue per plan of care        Precautions: standard     HEP: towel crunches    Specialty Daily Treatment Diary       Manual    STM/TPR dorsal and plantar aspect of L foot along peroneals     SMF RA   L ankle distraction RA RA  SMF RA   Mid foot A/P glides    SMF    L GT distraction RA RA  SMF RA   L PROM all planes RA RA  SMF RA   ISATM to Tib ant and calf near Achilles    SMF            Exercise Diary         RB 5 min  5 min    5 min            HR (B) -> Half foam HR (B) 20x 20x       Up on two lower on RLE                Airex Tandem 20 sec x 2  30 sec x 2       Airex SLS 20 sec x 2  30 sec x 2               Standing Arch Raises                        Slant board gastroc-soleus stretch 20 sec x 3    20 sec x 3 (gastroc only) 20 sec x 3                    Seated HS stretch        Toe Crunches 2 mins 2 mins  2 mins 2 mins    [place] Pickup        Toe Yoga (big toe up and romain down; big toe down and romain up) 2 sec x 10  2 sec x 10   2 sec x 10 ea 2 sec x 10    Dowel roll 2 mins  2 mins   2 mins 2 mins    Seated PF stretch                Toe Extension stretch                Seated Fitterboard circular (A/P, M/L, circles cw/ccw) -> Standing L3 20 ea  L3 20 ea   L3: 20 ea direction L3: 20 ea direction                   TB A' DF/PF OTB 20x OTB 20x   OTB 15x   TB A' Inv/Ev OTB 20x  OTB 20x    OTB 15x                   Foam roller calves                        TB Bridges        TB Clamshells                Sidelying H' ABD                Prone quad stretch c SOS        Supine HS stretch                HEP/EDU        Modalities        MH    5 mins pre            US        Laser                                   Skin checks performed pre and post application: intact

## 2022-12-29 ENCOUNTER — OFFICE VISIT (OUTPATIENT)
Dept: PHYSICAL THERAPY | Facility: CLINIC | Age: 52
End: 2022-12-29

## 2022-12-29 DIAGNOSIS — M62.81 MUSCLE WEAKNESS (GENERALIZED): ICD-10-CM

## 2022-12-29 DIAGNOSIS — M76.72 PERONEAL TENDONITIS, LEFT: Primary | ICD-10-CM

## 2022-12-29 DIAGNOSIS — M79.672 LEFT FOOT PAIN: ICD-10-CM

## 2022-12-29 NOTE — PROGRESS NOTES
Daily Note     Today's date: 2022  Patient name: Adrian Mcgee  : 1970  MRN: [id number]  Referring provider: Ottoniel Mijares DPM  Dx:   Encounter Diagnosis     ICD-10-CM    1  Peroneal tendonitis, left  M76 72       2  Left foot pain  M79 672       3  Muscle weakness (generalized)  M62 81                      Subjective: Patient reports that she is feeling better  Objective: See treatment diary below      Assessment: Tolerated treatment well; initiated POC with RB for active warmup  VCs provided on proper sequencing with exercises; added slant board soleus stretch  Patient demonstrated fatigue post treatment and would benefit from continued PT      Plan: Continue per plan of care        Precautions: standard     HEP: towel crunches    Specialty Daily Treatment Diary       Manual    STM/TPR dorsal and plantar aspect of L foot along peroneals      RA   L ankle distraction RA RA SMF  RA   Mid foot A/P glides        L GT distraction RA RA SMF  RA   L PROM all planes RA RA SMF  RA   ISATM to Tib ant and calf near Achilles                MRE's Inv, Ev, combo DF to Ev   SMF: 10 ea             Exercise Diary         RB 5 min  5 min  5 mins  5 min            HR (B) -> Half foam HR (B) 20x 20x  Half foam: 10 (p!); on Floor: 2x10     Up on two lower on RLE                Airex Tandem 20 sec x 2  30 sec x 2  30 sec x 2      Airex SLS 20 sec x 2  30 sec x 2  30 sec x 2              Standing Arch Raises                        Slant board gastroc-soleus stretch 20 sec x 3   20 sec x 3 ea (gastroc and soleus)  20 sec x 3                    Seated HS stretch        Toe Crunches 2 mins 2 mins 2 mins  2 mins    [place] Pickup        Toe Yoga (big toe up and romain down; big toe down and romain up) 2 sec x 10  2 sec x 10  2 sec x 10 ea  2 sec x 10    Dowel roll 2 mins  2 mins  2 mins  2 mins    Seated PF stretch                Toe Extension stretch                Seated Fitterboard circular (A/P, M/L, circles cw/ccw) -> Standing L3 20 ea  L3 20 ea  L3 30 ea  L3: 20 ea direction                   TB A' DF/PF OTB 20x OTB 20x NV  OTB 15x   TB A' Inv/Ev OTB 20x  OTB 20x  NV  OTB 15x                   Foam roller calves                        TB Bridges        TB Clamshells                Sidelying H' ABD                Prone quad stretch c SOS        Supine HS stretch                HEP/EDU        Modalities                        US        Laser                                   Skin checks performed pre and post application: intact

## 2023-01-03 ENCOUNTER — OFFICE VISIT (OUTPATIENT)
Dept: PHYSICAL THERAPY | Facility: CLINIC | Age: 53
End: 2023-01-03

## 2023-01-03 DIAGNOSIS — M62.81 MUSCLE WEAKNESS (GENERALIZED): ICD-10-CM

## 2023-01-03 DIAGNOSIS — M79.672 LEFT FOOT PAIN: ICD-10-CM

## 2023-01-03 DIAGNOSIS — M76.72 PERONEAL TENDONITIS, LEFT: Primary | ICD-10-CM

## 2023-01-03 NOTE — PROGRESS NOTES
Daily Note     Today's date: 1/3/2023  Patient name: Fly Varela  : 1970  MRN: [id number]  Referring provider: Ely Fine DPM  Dx:   Encounter Diagnosis     ICD-10-CM    1  Peroneal tendonitis, left  M76 72       2  Left foot pain  M79 672       3  Muscle weakness (generalized)  M62 81                      Subjective: Patient states she is doing well  Objective: See treatment diary below      Assessment: Tolerated treatment well  Patient arrived 8 min late to session, time constraints in place  Initiated POC with standing TE  She is demonstrating improvements with activity tolerance  Consider adding in additional strength next visit  Patient demonstrated fatigue post treatment, exhibited good technique with therapeutic exercises and would benefit from continued PT      Plan: Continue per plan of care        Precautions: standard     HEP: towel crunches    Specialty Daily Treatment Diary       Manual 12/22 12/27 12/29 1/3     STM/TPR dorsal and plantar aspect of L foot along peroneals         L ankle distraction RA RA SMF RA    Mid foot A/P glides        L GT distraction RA RA SMF     L PROM all planes RA RA SMF RA    ISATM to Tib ant and calf near Achilles                MRE's Inv, Ev, combo DF to Ev   SMF: 10 ea             Exercise Diary         RB 5 min  5 min  5 mins Patient was late             HR (B) -> Half foam HR (B) 20x 20x  Half foam: 10 (p!); on Floor: 2x10 30x     Up on two lower on RLE                Airex Tandem 20 sec x 2  30 sec x 2  30 sec x 2  30 sec x 2     Airex SLS 20 sec x 2  30 sec x 2  30 sec x 2  30 sec x 2             Standing Arch Raises                        Slant board gastroc-soleus stretch 20 sec x 3   20 sec x 3 ea (gastroc and soleus) 20 sec x 3 ea (gastroc and soleus)                    Seated HS stretch        Toe Crunches 2 mins 2 mins 2 mins 2 min    [place] Pickup        Toe Yoga (big toe up and romain down; big toe down and romain up) 2 sec x 10  2 sec x 10  2 sec x 10 ea 2 sec x 10 ea     Dowel roll 2 mins  2 mins  2 mins np     Seated PF stretch                Toe Extension stretch                Seated Fitterboard circular (A/P, M/L, circles cw/ccw) -> Standing L3 20 ea  L3 20 ea  L3 30 ea L3 30 ea                    TB A' DF/PF OTB 20x OTB 20x NV GTB 20x    TB A' Inv/Ev OTB 20x  OTB 20x  NV GTB 20x                     Foam roller calves                        TB Bridges        TB Clamshells                Sidelying H' ABD                Prone quad stretch c SOS        Supine HS stretch                HEP/EDU        Modalities                        US        Laser                                   Skin checks performed pre and post application: intact

## 2023-01-05 ENCOUNTER — OFFICE VISIT (OUTPATIENT)
Dept: PHYSICAL THERAPY | Facility: CLINIC | Age: 53
End: 2023-01-05

## 2023-01-05 DIAGNOSIS — M79.672 LEFT FOOT PAIN: ICD-10-CM

## 2023-01-05 DIAGNOSIS — M62.81 MUSCLE WEAKNESS (GENERALIZED): ICD-10-CM

## 2023-01-05 DIAGNOSIS — M76.72 PERONEAL TENDONITIS, LEFT: Primary | ICD-10-CM

## 2023-01-05 NOTE — PROGRESS NOTES
Daily Note     Today's date: 2023  Patient name: Paralee Fraction  : 1970  MRN: [id number]  Referring provider: Kate Singletary DPM  Dx:   Encounter Diagnosis     ICD-10-CM    1  Peroneal tendonitis, left  M76 72       2  Left foot pain  M79 672       3  Muscle weakness (generalized)  M62 81                      Subjective: Patient states she is doing alright  Objective: See treatment diary below      Assessment: Tolerated treatment well  Patient arrived 10 min late to session, therefore time constraints put in place  Discussed goals with patients and she has met all goals at this time  She will be going hiking [place] which will be a good test of how her ankle is doing  Assessed ROM which has improved in both eversion and dorsiflexion  Plan: Progress treatment as tolerated         Precautions: standard     HEP: towel crunches    Specialty Daily Treatment Diary       Manual 12/22 12/27 12/29 1/3  1/5   STM/TPR dorsal and plantar aspect of L foot along peroneals         L ankle distraction RA RA SMF RA    Mid foot A/P glides        L GT distraction RA RA SMF     L PROM all planes RA RA SMF RA    ISATM to Tib ant and calf near Achilles                MRE's Inv, Ev, combo DF to Ev   SMF: 10 ea             Exercise Diary         RB 5 min  5 min  5 mins Patient was late  3 min           HR (B) -> Half foam HR (B) 20x 20x  Half foam: 10 (p!); on Floor: 2x10 30x  30x   Up on two lower on RLE                Airex Tandem 20 sec x 2  30 sec x 2  30 sec x 2  30 sec x 2  30 sec x 2   Airex SLS 20 sec x 2  30 sec x 2  30 sec x 2  30 sec x 2  30 sec x 2            Standing Arch Raises                        Slant board gastroc-soleus stretch 20 sec x 3   20 sec x 3 ea (gastroc and soleus) 20 sec x 3 ea (gastroc and soleus) 20 sec x 3                    Seated HS stretch        Toe Crunches 2 mins 2 mins 2 mins 2 min DC   [place] Pickup        Toe Yoga (big toe up and romain down; big toe down and romain up) 2 sec x 10  2 sec x 10  2 sec x 10 ea 2 sec x 10 ea  DC to HEP   Dowel roll 2 mins  2 mins  2 mins np  DC to HEP   Seated PF stretch                Toe Extension stretch                Seated Fitterboard circular (A/P, M/L, circles cw/ccw) -> Standing L3 20 ea  L3 20 ea  L3 30 ea L3 30 ea L3 30 ea                   TB A' DF/PF OTB 20x OTB 20x NV GTB 20x GTB 30x   TB A' Inv/Ev OTB 20x  OTB 20x  NV GTB 20x  GTB 30x                    Foam roller calves                        TB Bridges        TB Clamshells                Sidelying H' ABD                Prone quad stretch c SOS        Supine HS stretch                HEP/EDU        Modalities                        US        Laser                                   Skin checks performed pre and post application: intact

## 2023-01-10 ENCOUNTER — APPOINTMENT (OUTPATIENT)
Dept: PHYSICAL THERAPY | Facility: CLINIC | Age: 53
End: 2023-01-10

## 2023-01-12 ENCOUNTER — OFFICE VISIT (OUTPATIENT)
Dept: PHYSICAL THERAPY | Facility: CLINIC | Age: 53
End: 2023-01-12

## 2023-01-12 DIAGNOSIS — M79.672 LEFT FOOT PAIN: ICD-10-CM

## 2023-01-12 DIAGNOSIS — M76.72 PERONEAL TENDONITIS, LEFT: Primary | ICD-10-CM

## 2023-01-12 DIAGNOSIS — M62.81 MUSCLE WEAKNESS (GENERALIZED): ICD-10-CM

## 2023-01-12 NOTE — PROGRESS NOTES
Daily Note / Discharge     Today's date: 2023  Patient name: Delvis Bledsoe  : 1970  MRN: [id number]  Referring provider: Bon Jimenez DPM  Dx:   Encounter Diagnosis     ICD-10-CM    1  Peroneal tendonitis, left  M76 72       2  Left foot pain  M79 672       3  Muscle weakness (generalized)  M62 81                      Subjective: Patient states she is doing really well  Objective: See treatment diary below      Assessment: Patient would like to be discharged at this time  She met all goals  She went on a big bike in [place] and did well without any onset of pain  Provided updated HEP and therabands  Discussed direct access if she has a future flare up         Plan: Discharge   Precautions: standard     HEP: towel crunches    Specialty Daily Treatment Diary       Manual 12/22 12/27 12/29 1/3  1/5   STM/TPR dorsal and plantar aspect of L foot along peroneals         L ankle distraction RA RA SMF RA    Mid foot A/P glides        L GT distraction RA RA SMF     L PROM all planes RA RA SMF RA    ISATM to Tib ant and calf near Achilles                MRE's Inv, Ev, combo DF to Ev   SMF: 10 ea             Exercise Diary         RB 5 min  5 min  5 mins Patient was late  3 min           HR (B) -> Half foam HR (B) 20x 20x  Half foam: 10 (p!); on Floor: 2x10 30x  30x   Up on two lower on RLE                Airex Tandem 20 sec x 2  30 sec x 2  30 sec x 2  30 sec x 2  30 sec x 2   Airex SLS 20 sec x 2  30 sec x 2  30 sec x 2  30 sec x 2  30 sec x 2            Standing Arch Raises                        Slant board gastroc-soleus stretch 20 sec x 3   20 sec x 3 ea (gastroc and soleus) 20 sec x 3 ea (gastroc and soleus) 20 sec x 3                    Seated HS stretch        Toe Crunches 2 mins 2 mins 2 mins 2 min DC   [place] Pickup        Toe Yoga (big toe up and romain down; big toe down and romain up) 2 sec x 10  2 sec x 10  2 sec x 10 ea 2 sec x 10 ea  DC to HEP   Dowel roll 2 mins  2 mins  2 mins np  DC to HEP   Seated PF stretch                Toe Extension stretch                Seated Fitterboard circular (A/P, M/L, circles cw/ccw) -> Standing L3 20 ea  L3 20 ea  L3 30 ea L3 30 ea L3 30 ea                   TB A' DF/PF OTB 20x OTB 20x NV GTB 20x GTB 30x   TB A' Inv/Ev OTB 20x  OTB 20x  NV GTB 20x  GTB 30x                    Foam roller calves                        TB Bridges        TB Clamshells                Sidelying H' ABD                Prone quad stretch c SOS        Supine HS stretch                HEP/EDU        Modalities                        US        Laser                                   Skin checks performed pre and post application: intact

## 2023-01-17 ENCOUNTER — APPOINTMENT (OUTPATIENT)
Dept: PHYSICAL THERAPY | Facility: CLINIC | Age: 53
End: 2023-01-17

## 2023-01-19 ENCOUNTER — APPOINTMENT (OUTPATIENT)
Dept: PHYSICAL THERAPY | Facility: CLINIC | Age: 53
End: 2023-01-19

## 2023-01-24 ENCOUNTER — APPOINTMENT (OUTPATIENT)
Dept: PHYSICAL THERAPY | Facility: CLINIC | Age: 53
End: 2023-01-24

## 2023-01-26 ENCOUNTER — APPOINTMENT (OUTPATIENT)
Dept: PHYSICAL THERAPY | Facility: CLINIC | Age: 53
End: 2023-01-26

## 2023-01-31 ENCOUNTER — APPOINTMENT (OUTPATIENT)
Dept: PHYSICAL THERAPY | Facility: CLINIC | Age: 53
End: 2023-01-31

## 2023-03-06 ENCOUNTER — APPOINTMENT (OUTPATIENT)
Dept: PHYSICAL THERAPY | Facility: CLINIC | Age: 53
End: 2023-03-06

## 2023-03-08 ENCOUNTER — OFFICE VISIT (OUTPATIENT)
Dept: PHYSICAL THERAPY | Facility: CLINIC | Age: 53
End: 2023-03-08

## 2023-03-08 DIAGNOSIS — H81.12 BENIGN PAROXYSMAL POSITIONAL VERTIGO OF LEFT EAR: ICD-10-CM

## 2023-03-08 DIAGNOSIS — R42 DIZZINESS: ICD-10-CM

## 2023-03-08 DIAGNOSIS — R42 VERTIGO: Primary | ICD-10-CM

## 2023-03-08 NOTE — PROGRESS NOTES
PT Evaluation     Today's date: 3/8/2023  Patient name: Yolanda Mcelroy  : 1970  MRN: [id number]  Referring provider: Dillon Ibrahim PT  Dx:   Encounter Diagnosis     ICD-10-CM    1  Vertigo  R42       2  Benign paroxysmal positional vertigo of left ear  H81 12       3  Dizziness  R42             Assessment  Assessment details: Yolanda cMelroy is a 46 y o  female presenting to outpatient physical therapy at [place] [address] with complaints of vertigo  She presents with decreased range of motion, decreased strength, limited flexibility, poor postural awareness, poor body mechanics, altered gait pattern, poor balance, decreased tolerance to activity and decreased functional mobility due to Vertigo  (primary encounter diagnosis), Benign paroxysmal positional vertigo of left ear  Therapist discussed diagnosis, prognosis, plan of care, proper responses to exercises, and HEP   She would benefit from skilled PT services in order to address these deficits and reach maximum level of function   Thank you for the referral!  Impairments: abnormal coordination, abnormal muscle firing, abnormal muscle tone, abnormal or restricted ROM, abnormal movement, activity intolerance, impaired balance, impaired physical strength, lacks appropriate home exercise program, pain with function, scapular dyskinesis, poor posture  and poor body mechanics    Symptom irritability: moderateUnderstanding of Dx/Px/POC: good   Prognosis: good    Goals  STGs (in 4 weeks):  1  Pt will report having at least a 50% improvement since I E    2  Pt will report having no sxs with bed mobility  3  Pt will deny having sxs with turning, tilting, rotating head in all directions  LTGs (in 12 weeks):  1  Pt will report having at least a 75% improvement since I E   2  Pt will be independent with HEP  3  Pt will deny having increased sxs with turning, tilting, and rotating neck while walking and standing       Plan  Patient would benefit from: skilled physical therapy  Planned modality interventions: TENS and unattended electrical stimulation  Planned therapy interventions: joint mobilization, manual therapy, patient education, self care, strengthening, stretching, therapeutic activities, therapeutic exercise, home exercise program, gait training, flexibility, balance and canalith repositioning  Frequency: 2x week  Plan of Care beginning date: 3/8/2023  Plan of Care expiration date: 2023  Treatment plan discussed with: patient        Subjective Evaluation    History of Present Illness  Mechanism of injury: Pt is a 46year old female who presents with recent exacerbation of dizziness and spinning  She reports last Fall she was camping by herself and had onset of severe dizziness  She went to ED and was seen by ENT and physical therapy  Her sxs were resolved  She reports this past week she has noticed feeling "spacey" and "floating" which increased on Monday with insidious exacerbation  She reports having difficulty focusing with computer and had to lie down for a half hour  She reports that yesterday started to feel better     Quality of life: good    Pain  Current pain ratin  At best pain ratin  At worst pain ratin  Quality: "spacey", "floating"   Relieving factors: relaxation and rest  Aggravating factors: overhead activity (turning/tilting/rotating head, computer work, looking up, bed mobility)  Progression: worsening    Patient Goals  Patient goals for therapy: decreased pain, improved balance, increased motion, increased strength, independence with ADLs/IADLs and return to sport/leisure activities          Objective    Observation/Posture: FHP, rounded shoulders    Cervical Screen  (*=pain)  (**=symptom provocation)    AROM: (deg)  Flexion: 40  Extension: 45  R Rot: 50  L Rot: 63  R Lat Flex: 32  L Lat Flex: 30    Palpation/Tenderness: some TPR B UT, LS    Joint Mobility:  Decreased PA mob cervical and thoracic    Strength: (MMT)    R  L  S' Flex  4/5  4/5  S' ABD  4/5  4/5  S' ER  4/5  4/5  S' IR  4/5  4/5    UT  NT  NT  MT  NT  NT  LT  NT  NT    Visual-Occular and Vestibular Testing:  Smooth pursuits: negative  Convergence: negative  Saccades: negative  VORx1: negative  [place]-Hallpike: (R) positive (L) positive (L worse than R)      Special Tests  Vertebral Artery: negative           Precautions: standard    HEP: postural awareness    Specialty Daily Treatment Diary       Manual 3/8       SOR        TPR/STM B UT, LS, cervical paraspinals        B UT stretch        B LS stretch                PA glides of cervical paraspinals        OP Chin retraction                        Exercise Diary         UBE retro                Eply (L): x 4               Hybrid Roll                Supine chin tucks        Supine Hugs to T for pec stretch                Sidelying Thoracic Rotation stretch                Seated chin tucks        Chin tucks to Extension        SNAGs Extension        SNAGs Rotation                Seated Thoracic Extension over half foam                B UT stretch c chin tuck        B LS stretch                 TB Scap Retraction        TB B S' Ext                TB B ER c chin tucks        TB B Horiz ABD c chin tuck                Doorway pec stretch shoulder abducted around 60 drgs                        VOR 1 sitting (horiz, vert, diagonally) -> standing        Cancellation sitting (horiz, vert, diagonally) -> standing        VOR 2 (horiz, vert, diagonally) -> standing                VOR 1 dynamic                Hallwalks (horiz, vert, diagonally)         Walking ball toss                                                HEP/EDU Diagnosis, prognosis, POC, proper responses to exercises, HEP       Modalities                                   Skin checks performed pre and post application: intact

## 2023-03-15 ENCOUNTER — OFFICE VISIT (OUTPATIENT)
Dept: PHYSICAL THERAPY | Facility: CLINIC | Age: 53
End: 2023-03-15

## 2023-03-15 DIAGNOSIS — R42 DIZZINESS: ICD-10-CM

## 2023-03-15 DIAGNOSIS — R42 VERTIGO: Primary | ICD-10-CM

## 2023-03-15 DIAGNOSIS — H81.12 BENIGN PAROXYSMAL POSITIONAL VERTIGO OF LEFT EAR: ICD-10-CM

## 2023-03-15 NOTE — PROGRESS NOTES
Daily Note     Today's date: 3/15/2023  Patient name: Camryn Chakraborty  : 1970  MRN: [id number]  Referring provider: Gelacio Ayala, PT  Dx:   Encounter Diagnosis     ICD-10-CM    1  Vertigo  R42       2  Benign paroxysmal positional vertigo of left ear  H81 12       3  Dizziness  R42                      Subjective: Patient reports that she felt a little off after last time and feels as though she is on the verge of having sxs but denies having any  Objective: See treatment diary below      Assessment: Tolerated treatment well; initiated POC with [place]-Hallpike which is what negative  MT performed after receiving consent from pt; she denies having pain  VCs provided on proper sequencing with exercises; added chin tucks in supine and sitting f/b SNAGs  Patient demonstrated fatigue post treatment and would benefit from continued PT      Plan: Continue per plan of care          Precautions: standard    HEP: postural awareness    Specialty Daily Treatment Diary       Manual 3/8 3/15      SOR  SMF      TPR/STM B UT, LS, cervical paraspinals  SMF ea      B UT stretch  SMF ea      B LS stretch                PA glides of cervical paraspinals  SMF      OP Chin retraction                        Exercise Diary         UBE retro                Eply (L): x 4 (L) x 1              Hybrid Roll                Supine chin tucks  5 sec x 30 c MH      Supine Hugs to T for pec stretch                Sidelying Thoracic Rotation stretch                Seated chin tucks  5 sec x 10      Chin tucks to Extension        SNAGs Extension  2 sec x 10      SNAGs Rotation                Seated Thoracic Extension over half foam                B UT stretch c chin tuck        B LS stretch                 TB Scap Retraction        TB B S' Ext                TB B ER c chin tucks        TB B Horiz ABD c chin tuck                Doorway pec stretch shoulder abducted around 60 drgs                        VOR 1 sitting (horiz, vert, diagonally) -> standing        Cancellation sitting (horiz, vert, diagonally) -> standing        VOR 2 (horiz, vert, diagonally) -> standing                VOR 1 dynamic                Hallwalks (horiz, vert, diagonally)         Walking ball toss                                                HEP/EDU Diagnosis, prognosis, POC, proper responses to exercises, HEP       Modalities          5 mins c chin tucks for tissue prep                         Skin checks performed pre and post application: intact

## 2023-03-16 ENCOUNTER — APPOINTMENT (OUTPATIENT)
Dept: PHYSICAL THERAPY | Facility: CLINIC | Age: 53
End: 2023-03-16

## 2023-06-23 ENCOUNTER — TELEPHONE (OUTPATIENT)
Age: 53
End: 2023-06-23

## 2023-06-23 NOTE — TELEPHONE ENCOUNTER
Patients GI provider:  Dr Tony Goetz    Number to return call: [phone]    Reason for call: Karyle Landsman from [facility]  Internal Medicine calling wanting pt's 9/26/2020 colonoscopy w/ Dr Tony Goetz results faxed to [phone]  Karyle Sharifa can be reached at the above number      Scheduled procedure/appointment date if applicable: N/A

## 2023-08-05 ENCOUNTER — HOSPITAL ENCOUNTER (OUTPATIENT)
Dept: ULTRASOUND IMAGING | Facility: HOSPITAL | Age: 53
Discharge: HOME | End: 2023-08-05
Payer: COMMERCIAL

## 2023-08-05 ENCOUNTER — HOSPITAL ENCOUNTER (OUTPATIENT)
Dept: ULTRASOUND IMAGING | Facility: HOSPITAL | Age: 53
Discharge: HOME | End: 2023-08-05
Attending: FAMILY MEDICINE
Payer: COMMERCIAL

## 2023-08-05 DIAGNOSIS — R14.0 ABDOMINAL DISTENTION: ICD-10-CM

## 2023-08-05 PROCEDURE — 76700 US EXAM ABDOM COMPLETE: CPT

## 2023-08-05 PROCEDURE — 76830 TRANSVAGINAL US NON-OB: CPT

## 2023-08-05 PROCEDURE — 76856 US EXAM PELVIC COMPLETE: CPT

## 2023-08-22 ENCOUNTER — HOSPITAL ENCOUNTER (OUTPATIENT)
Dept: NON INVASIVE DIAGNOSTICS | Age: 53
Discharge: HOME | End: 2023-08-22
Payer: COMMERCIAL

## 2023-08-22 VITALS
HEART RATE: 71 BPM | SYSTOLIC BLOOD PRESSURE: 130 MMHG | BODY MASS INDEX: 30.73 KG/M2 | WEIGHT: 180 LBS | HEIGHT: 64 IN | DIASTOLIC BLOOD PRESSURE: 70 MMHG

## 2023-08-22 DIAGNOSIS — R60.0 LOCALIZED EDEMA: ICD-10-CM

## 2023-08-22 DIAGNOSIS — R06.02 SHORTNESS OF BREATH: ICD-10-CM

## 2023-08-22 LAB
AORTIC ROOT: 2.7 CM
APICAL FOUR CHAMBER EJECTION FRACTION: 69 %
ASCENDING AORTA: 3.1 CM
E WAVE DECELERATION TIME: 190 MS
FRACTIONAL SHORTENING: 28 % (ref 28–44)
INTERVENTRICULAR SEPTUM IN DIASTOLE (PARASTERNAL SHORT AXIS VIEW): 0.8 CM
INTERVENTRICULAR SEPTUM: 0.8 CM (ref 0.6–1.1)
LAAS-AP2: 12.7 CM2
LAAS-AP4: 17.3 CM2
LEFT ATRIUM SIZE: 3.5 CM
LEFT ATRIUM VOLUME (MOD BIPLANE): 46 ML
LEFT INTERNAL DIMENSION IN SYSTOLE: 3.1 CM (ref 2.1–4)
LEFT VENTRICLE DIASTOLIC VOLUME (MOD BIPLANE): 127 ML
LEFT VENTRICLE SYSTOLIC VOLUME (MOD BIPLANE): 41 ML
LEFT VENTRICULAR INTERNAL DIMENSION IN DIASTOLE: 4.3 CM (ref 3.5–6)
LEFT VENTRICULAR POSTERIOR WALL IN END DIASTOLE: 0.7 CM
LEFT VENTRICULAR STROKE VOLUME: 46 ML
LV EF: 67 %
LVSV (TEICH): 46 ML
MV E'TISSUE VEL-SEP: 8 CM/S
MV PEAK A VEL: 0.81 M/S
MV PEAK E VEL: 57 CM/S
MV STENOSIS PRESSURE HALF TIME: 55 MS
MV VALVE AREA P 1/2 METHOD: 4 CM2
RIGHT ATRIUM AREA SYSTOLE A4C: 11.1 CM2
RIGHT VENTRICLE ID DIMENSION: 2.7 CM
SL CV LEFT ATRIUM LENGTH A2C: 3.6 CM
SL CV LV EF: 60
SL CV PED ECHO LEFT VENTRICLE DIASTOLIC VOLUME (MOD BIPLANE) 2D: 85 ML
SL CV PED ECHO LEFT VENTRICLE SYSTOLIC VOLUME (MOD BIPLANE) 2D: 39 ML
TR MAX PG: 21 MMHG
TR PEAK VELOCITY: 2.3 M/S
TRICUSPID ANNULAR PLANE SYSTOLIC EXCURSION: 1.8 CM
TRICUSPID VALVE PEAK REGURGITATION VELOCITY: 2.31 M/S

## 2023-08-22 PROCEDURE — 93306 TTE W/DOPPLER COMPLETE: CPT | Performed by: INTERNAL MEDICINE

## 2023-08-22 PROCEDURE — 93306 TTE W/DOPPLER COMPLETE: CPT

## 2023-08-23 ENCOUNTER — HOSPITAL ENCOUNTER (OUTPATIENT)
Facility: MEDICAL CENTER | Age: 53
Discharge: HOME | End: 2023-08-23
Payer: COMMERCIAL

## 2023-08-23 DIAGNOSIS — N94.89 PELVIC CONGESTION SYNDROME: ICD-10-CM

## 2023-08-23 PROCEDURE — 72197 MRI PELVIS W/O & W/DYE: CPT

## 2023-08-23 PROCEDURE — G1004 CDSM NDSC: HCPCS

## 2023-08-23 PROCEDURE — A9585 GADOBUTROL INJECTION: HCPCS | Performed by: INTERNAL MEDICINE

## 2023-08-23 RX ORDER — GADOBUTROL 604.72 MG/ML
9 INJECTION INTRAVENOUS
Status: COMPLETED | OUTPATIENT
Start: 2023-08-23 | End: 2023-08-23

## 2023-08-23 RX ADMIN — GADOBUTROL 9 ML: 604.72 INJECTION INTRAVENOUS at 16:23

## 2023-11-01 ENCOUNTER — OFFICE VISIT (OUTPATIENT)
Dept: PHYSICAL THERAPY | Facility: CLINIC | Age: 53
End: 2023-11-01
Payer: COMMERCIAL

## 2023-11-01 DIAGNOSIS — M62.838 MUSCLE SPASM OF RIGHT LOWER EXTREMITY: Primary | ICD-10-CM

## 2023-11-01 PROCEDURE — 97161 PT EVAL LOW COMPLEX 20 MIN: CPT | Performed by: PHYSICAL THERAPIST

## 2023-11-01 PROCEDURE — 97110 THERAPEUTIC EXERCISES: CPT | Performed by: PHYSICAL THERAPIST

## 2023-11-29 ENCOUNTER — HOSPITAL ENCOUNTER (OUTPATIENT)
Dept: CT IMAGING | Facility: HOSPITAL | Age: 53
Discharge: HOME | End: 2023-11-29
Attending: FAMILY MEDICINE
Payer: COMMERCIAL

## 2023-11-29 DIAGNOSIS — N94.89 ADNEXAL MASS: ICD-10-CM

## 2023-11-29 PROCEDURE — 74177 CT ABD & PELVIS W/CONTRAST: CPT

## 2023-11-29 PROCEDURE — G1004 CDSM NDSC: HCPCS

## 2023-11-29 RX ADMIN — IOHEXOL 100 ML: 350 INJECTION, SOLUTION INTRAVENOUS at 18:45

## 2024-03-06 ENCOUNTER — TELEPHONE (OUTPATIENT)
Dept: FAMILY MEDICINE CLINIC | Facility: CLINIC | Age: 54
End: 2024-03-06

## 2024-03-06 NOTE — TELEPHONE ENCOUNTER
Hi there my name is Nilam Shukla CELSO TT. My YOB: 1970. I was exposed to Poison Ivy over the weekend and now I have it. Looking in my records, you'll see that this is a common occurrence and that I have a very intense reaction to it and that Prednisone is prescribed to kind of help me work through it because my whole body just kind of freaks out when I get it. So I'm calling to see if I can get a prescription for the Prednisone to help me with this exposure or if you need me to come in just to take a look at it. I have it on my arm and now it's on my on my torso. Yeah, just let me know. So my number is [phone]. Leela Shukla [birth year], Poison Amanda. Thanks. Shayla.

## 2024-04-25 ENCOUNTER — OFFICE VISIT (OUTPATIENT)
Dept: FAMILY MEDICINE CLINIC | Facility: CLINIC | Age: 54
End: 2024-04-25
Payer: COMMERCIAL

## 2024-04-25 ENCOUNTER — TELEPHONE (OUTPATIENT)
Age: 54
End: 2024-04-25

## 2024-04-25 VITALS
OXYGEN SATURATION: 99 % | TEMPERATURE: 96.3 F | WEIGHT: 191.4 LBS | BODY MASS INDEX: 32.68 KG/M2 | HEIGHT: 64 IN | HEART RATE: 92 BPM | SYSTOLIC BLOOD PRESSURE: 124 MMHG | DIASTOLIC BLOOD PRESSURE: 70 MMHG

## 2024-04-25 DIAGNOSIS — E66.09 CLASS 1 OBESITY DUE TO EXCESS CALORIES WITHOUT SERIOUS COMORBIDITY WITH BODY MASS INDEX (BMI) OF 32.0 TO 32.9 IN ADULT: Primary | ICD-10-CM

## 2024-04-25 DIAGNOSIS — Z12.31 ENCOUNTER FOR SCREENING MAMMOGRAM FOR MALIGNANT NEOPLASM OF BREAST: ICD-10-CM

## 2024-04-25 DIAGNOSIS — E78.49 OTHER HYPERLIPIDEMIA: ICD-10-CM

## 2024-04-25 DIAGNOSIS — Z13.220 SCREENING FOR LIPID DISORDERS: ICD-10-CM

## 2024-04-25 DIAGNOSIS — Z13.0 SCREENING FOR DEFICIENCY ANEMIA: ICD-10-CM

## 2024-04-25 DIAGNOSIS — Z13.29 SCREENING FOR THYROID DISORDER: ICD-10-CM

## 2024-04-25 PROBLEM — E66.811 CLASS 1 OBESITY DUE TO EXCESS CALORIES WITHOUT SERIOUS COMORBIDITY WITH BODY MASS INDEX (BMI) OF 32.0 TO 32.9 IN ADULT: Status: ACTIVE | Noted: 2024-04-25

## 2024-04-25 PROCEDURE — 99204 OFFICE O/P NEW MOD 45 MIN: CPT | Performed by: NURSE PRACTITIONER

## 2024-04-25 RX ORDER — NALTREXONE HYDROCHLORIDE AND BUPROPION HYDROCHLORIDE 8; 90 MG/1; MG/1
TABLET, EXTENDED RELEASE ORAL
Qty: 70 TABLET | Refills: 0 | Status: SHIPPED | OUTPATIENT
Start: 2024-04-25

## 2024-04-25 NOTE — TELEPHONE ENCOUNTER
Nilam called in to called to find out why medication was not sent to pharmacy yet. I informed her that the Contrave requires Prior auth from the Insurance and that the process can take 7-21 busines days. Advised patient to call back next week to check on the pior auth status.

## 2024-04-25 NOTE — PATIENT INSTRUCTIONS
Contrave- Start orally once daily in the morning for week 1; then 1 tablet twice daily, morning and evening, for week 2; then 2 tablets in the morning and 1 tablet in the evening for week 3, week 4 start 2 tablets in AM and 2 tablets in PM    Do fasting blood work June 20, 2024 or later

## 2024-04-25 NOTE — PROGRESS NOTES
Name: Nilam Shukla      : 1970      MRN: [id number]  Encounter Provider: YAZMIN Chawla  Encounter Date: 2024   Encounter department: [facility]    Assessment & Plan     1. Class 1 obesity due to excess calories without serious comorbidity with body mass index (BMI) of 32.0 to 32.9 in adult  Assessment & Plan:  Discussed medications, risks and benefits discussed, side effects discussed of medication  Recommend starting contrave taper as directed  Continue dietary modifications  Qsymia is contraindicated due to glaucoma   If doesn't tolerate contrave will contact insurance and appeal for wegovy    Orders:  -     Naltrexone-buPROPion HCl ER (Contrave) 8-90 MG TB12; Take 1 tablet PO daily in morning for 7 days, then 1 tablet PO twice daily for 7 days, then week 3 start 2 tablets PO in AM and 1 tablet PO in PM, week 4 start 2 tablets PO in AM and 2 tablets PO in PM    2. Screening for thyroid disorder  -     TSH, 3rd generation with Free T4 reflex; Future  -     TSH, 3rd generation with Free T4 reflex    3. Screening for lipid disorders  -     Lipid Panel with Direct LDL reflex; Future  -     Lipid Panel with Direct LDL reflex    4. Screening for deficiency anemia  -     Comprehensive metabolic panel; Future  -     CBC and differential; Future  -     Comprehensive metabolic panel  -     CBC and differential    5. Encounter for screening mammogram for malignant neoplasm of breast  -     Mammo screening bilateral w 3d & cad; Future    6. Other hyperlipidemia  Assessment & Plan:  Elevated 2023  Recheck labs in       She is here to discuss weight loss medications- was prescribed wegovy but was able to  due to supply then insurance came back this year saying they will not cover it without qsymia or contrave for 6 months without improvement in weight.     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 She has made dietary changes, intermittent fasting, portion control, shakeology meal replacement over the last 18 months without ability to lose weight. She sleeps well, manages her stress levels.     She had ovaries removed in 2019 and have felt her weight has been struggle for her since.     No family history of thyroid cancers or neuroendocrine cancers  No personal history of pancreatitis    Mother had history of glaucoma-she has being closely monitored for glaucoma- she does not yet need eye drops.    Qsymia would be contraindicated and to high of a risk to trial due to glaucoma.     Hx of BPPV did PT for it in the past she knows exercises to do.    She does shakeology so doesn't take multivitamin          Review of Systems   Constitutional:  Negative for chills and fever.   HENT: Negative.  Negative for ear pain and sore throat.    Eyes:  Negative for pain and visual disturbance.   Respiratory:  Negative for cough and shortness of breath.    Cardiovascular:  Positive for palpitations (occasionally will feel palpitations). Negative for chest pain.   Gastrointestinal:  Negative for abdominal pain, constipation, diarrhea, nausea and vomiting.   Endocrine: Negative.    Genitourinary:  Negative for dysuria and hematuria.   Musculoskeletal:  Negative for arthralgias and back pain.   Skin:  Negative for color change and rash.   Neurological:  Negative for dizziness, seizures, syncope and headaches.   Psychiatric/Behavioral:  Negative for sleep disturbance.    All other systems reviewed and are negative.      No current outpatient medications on file prior to visit.       Objective     /70   Pulse 92   Temp (!) 96.3 °F (35.7 °C) (Tympanic)   Ht 5' 4\" (1.626 m)   Wt 86.8 kg (191 lb 6.4 oz)   SpO2 99%   BMI 32.85 kg/m²     Physical Exam  Vitals and nursing note reviewed.   Constitutional:       General: She is not in acute distress.     Appearance: Normal appearance. "   HENT:      Head: Normocephalic.      Right Ear: External ear normal.      Left Ear: External ear normal.   Eyes:      Extraocular Movements: Extraocular movements intact.      Conjunctiva/sclera: Conjunctivae normal.      Pupils: Pupils are equal, round, and reactive to light.   Cardiovascular:      Rate and Rhythm: Normal rate and regular rhythm.      Pulses:           Radial pulses are 1+ on the right side and 1+ on the left side.      Heart sounds: Normal heart sounds. No murmur heard.  Pulmonary:      Effort: Pulmonary effort is normal.      Breath sounds: Normal breath sounds.   Abdominal:      General: Bowel sounds are normal.      Palpations: Abdomen is soft.   Musculoskeletal:         General: Normal range of motion.      Cervical back: Normal range of motion. No tenderness.      Right lower leg: No edema.      Left lower leg: No edema.   Lymphadenopathy:      Cervical: No cervical adenopathy.   Skin:     General: Skin is warm and dry.   Neurological:      Mental Status: She is alert and oriented to person, place, and time.   Psychiatric:         Mood and Affect: Mood normal.         Behavior: Behavior normal.         Thought Content: Thought content normal.         Judgment: Judgment normal.       YAZMIN Chawla

## 2024-04-25 NOTE — TELEPHONE ENCOUNTER
PA for Naltrexone-buPROPion HCl ER (Contrave)     Submitted via    []CMM-KEY    [x][employer]-Case ID # [id number]   []Faxed to plan   []Other website    []Phone call Case ID #      Office notes sent, clinical questions answered. Awaiting determination    Turnaround time for your insurance to make a decision on your Prior Authorization can take 7-21 business days.

## 2024-04-26 NOTE — TELEPHONE ENCOUNTER
PA for Naltrexone-Buprop Approved     Date(s) approved 4/25/24-10/25/24  Case #[id number]     Patient advised by [x] [employer] Message                      [] Phone call      []LMOM     []L/M to call office as no active Communication consent on file     []Unable to leave detailed message as VM not approved on Communication consent         Pharmacy advised by [x]Fax                                     []Phone call    Approval letter scanned into Media Yes

## 2024-05-04 NOTE — ASSESSMENT & PLAN NOTE
Discussed medications, risks and benefits discussed, side effects discussed of medication  Recommend starting contrave taper as directed  Continue dietary modifications  Qsymia is contraindicated due to glaucoma   If doesn't tolerate contrave will contact insurance and appeal for wegovy

## 2024-05-10 DIAGNOSIS — E66.09 CLASS 1 OBESITY DUE TO EXCESS CALORIES WITHOUT SERIOUS COMORBIDITY WITH BODY MASS INDEX (BMI) OF 32.0 TO 32.9 IN ADULT: Primary | ICD-10-CM

## 2024-05-13 ENCOUNTER — TELEPHONE (OUTPATIENT)
Dept: FAMILY MEDICINE CLINIC | Facility: CLINIC | Age: 54
End: 2024-05-13

## 2024-05-13 NOTE — TELEPHONE ENCOUNTER
Semaglutide-Weight Management (WEGOVY) 0.25 MG/0.5ML     Please completed prior Auth for Wegovi  [employer]   [id number]

## 2024-05-29 ENCOUNTER — OFFICE VISIT (OUTPATIENT)
Dept: FAMILY MEDICINE CLINIC | Facility: CLINIC | Age: 54
End: 2024-05-29
Payer: COMMERCIAL

## 2024-05-29 VITALS
SYSTOLIC BLOOD PRESSURE: 116 MMHG | DIASTOLIC BLOOD PRESSURE: 72 MMHG | HEART RATE: 74 BPM | OXYGEN SATURATION: 97 % | BODY MASS INDEX: 31.41 KG/M2 | WEIGHT: 184 LBS | TEMPERATURE: 96.6 F | HEIGHT: 64 IN

## 2024-05-29 DIAGNOSIS — E66.09 CLASS 1 OBESITY DUE TO EXCESS CALORIES WITHOUT SERIOUS COMORBIDITY WITH BODY MASS INDEX (BMI) OF 31.0 TO 31.9 IN ADULT: ICD-10-CM
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
## 2024-05-29 PROCEDURE — 99213 OFFICE O/P EST LOW 20 MIN: CPT | Performed by: FAMILY MEDICINE

## 2024-05-29 RX ORDER — DOXYCYCLINE 100 MG/1
100 CAPSULE ORAL 2 TIMES DAILY
Qty: 20 CAPSULE | Refills: 0 | Status: SHIPPED | OUTPATIENT
Start: 2024-05-29 | End: 2024-06-08

## 2024-05-29 NOTE — PROGRESS NOTES
Assessment/Plan:      1. Nonvenomous insect bite  Assessment & Plan:  Rash- likely ecm vs cellulitis. Pt currently without symptoms, no neck pain or headaches or joint pains or fatigue or flu like symptoms  Has history of lyme disease but has never had rash. No open areas. Start doxycycline 1 tab twice a day   Orders:  -     doxycycline monohydrate (MONODOX) 100 mg capsule; Take 1 capsule (100 mg total) by mouth 2 (two) times a day for 10 days  2. Class 1 obesity due to excess calories without serious comorbidity with body mass index (BMI) of 31.0 to 31.9 in adult  Assessment & Plan:  Lost 7 pounds over the past 3 weeks since starting the medication. Tolerating the medication and hot flashes have improved since starting on the medication. Continue medication        Subjective:  Chief Complaint   Patient presents with    Insect Bite     Bug bite on the back of her right knee that's causing her severe pain. Woke up with it on Friday morning. Redness and swelling. Put aloe and clobetasol on it and it did not improve. No fever or further sx.     Would like to discuss shingles vaccine.   Mammo- Scheduled.        Patient ID: Nilam Shukla is a 53 y.o. female.    Complains of bumps on back of right knee-noticed 1 week ago  Went on walk on Thursday night, had leggings on.   Woke up on Friday am with more swelling -painful, no itching. Swollen and red.  Denies joint pains, headaches or flu like symptoms.  On semaglutide since 5/10, hot flashes better, tolerating medication, down to 184 pounds 4/25 191 pounds    Insect Bite  Associated symptoms include a rash. Pertinent negatives include no coughing, fatigue or fever.       Review of Systems   Constitutional: Negative.  Negative for fatigue and fever.   HENT: Negative.     Eyes: Negative.    Respiratory: Negative.  Negative for cough.    Cardiovascular: Negative.    Gastrointestinal: Negative.    Endocrine: Negative.    Genitourinary: Negative.    Musculoskeletal: Negative.   "  Skin:  Positive for rash.   Allergic/Immunologic: Negative.    Neurological: Negative.    Psychiatric/Behavioral: Negative.           The following portions of the patient's history were reviewed and updated as appropriate: allergies, current medications, past family history, past medical history, past social history, past surgical history and problem list.    Objective:  Vitals:    05/29/24 1000   BP: 116/72   BP Location: Left arm   Patient Position: Sitting   Cuff Size: Standard   Pulse: 74   Temp: (!) 96.6 °F (35.9 °C)   TempSrc: Tympanic   SpO2: 97%   Weight: 83.5 kg (184 lb)   Height: 5' 4\" (1.626 m)      Physical Exam  Vitals and nursing note reviewed.   Constitutional:       Appearance: She is well-developed.   HENT:      Head: Normocephalic and atraumatic.   Cardiovascular:      Rate and Rhythm: Normal rate and regular rhythm.      Heart sounds: Normal heart sounds.   Pulmonary:      Effort: Pulmonary effort is normal.      Breath sounds: Normal breath sounds.   Abdominal:      General: Bowel sounds are normal.      Palpations: Abdomen is soft.   Skin:     General: Skin is warm and dry.      Findings: Erythema and rash present.      Comments: Popliteal fossa right leg 4cm erythematous circular rash with darker center, no open areas.    Neurological:      Mental Status: She is alert and oriented to person, place, and time.   Psychiatric:         Behavior: Behavior normal.         Thought Content: Thought content normal.         Judgment: Judgment normal.         "

## 2024-05-29 NOTE — PATIENT INSTRUCTIONS
Doxycycline 1 tab twice a day   Continue wegovy     Lyme Disease   AMBULATORY CARE:   Lyme disease  is a bacterial infection caused by the bite of an infected tick.  Common signs and symptoms:   A red rash that is often round and may look like a target or bull's eye    Fever, chills, or sore throat    Weakness and tiredness    Headache or muscle aches    Joint pain    Abdominal pain, nausea, or diarrhea    Call your local emergency number (911 in the [place]) if:   Your heart is beating faster than usual and you feel dizzy.     You have chest pain or trouble breathing.    You suddenly cannot talk or see well, or you have trouble moving an area of your body.    Seek immediate care if:   You have a headache and a stiff neck.    You have trouble concentrating or thinking clearly.    You have numbness or tingling in your arms or legs, or you have trouble walking.    Call your doctor if:   Your rash grows or spreads to other areas of your body.    You suddenly have trouble falling or staying asleep.    You have new or worsening pain and swelling in your joints.    You have new or worsening weakness and muscle pain.    You have a new tick bite.    You have questions or concerns about your condition or care.    Treatment for Lyme disease  includes antibiotics to treat the bacterial infection.   Prevent a tick bite:  Ticks live in areas covered by brush and grass. They may even be found in your lawn if you live in certain areas. Outdoor pets can carry ticks inside the house. Ticks can grab onto you or your clothes when you walk by grass or brush. If you go into areas that contain many trees, tall grasses, and underbrush, do the following:     Wear light colored pants and a long-sleeved shirt.  Tuck your pants into your socks or boots. Tuck in your shirt. Wear sleeves that fit close to the skin at your wrists and neck. This will help prevent ticks from crawling through gaps in your clothing and onto your skin. Wear a hat in areas  with trees.    Apply insect repellant on your skin.  The insect repellant should contain DEET. Do not put insect repellant on skin that is cut, scratched, or irritated. Always use soap and water to wash the insect repellant off as soon as possible once you are indoors. Do not apply insect repellant on your child's face or hands.    Spray insect repellant onto your clothes.  Use permethrin spray. This spray kills ticks that crawl on your clothing. Be sure to spray the tops of your boots, bottom of pant legs, and sleeve cuffs. As soon as possible, wash and dry clothing in hot water and high heat.    Check your and your child's clothing, hair, and skin for ticks.  Shower within 2 hours of coming indoors. Carefully check the hairline, armpits, neck, and waist.    Decrease the risk for ticks in your yard.  Ticks like to live in shady, moist areas. Mow your lawn regularly to keep the grass short. Trim the grass around birdbaths and fences. Cut branches that are overgrown and take them out of the yard. Clear out leaf piles. Stack firewood in a dry, koki area.    Treat pets with tick control products  as directed. This will decrease your risk for a tick bite. Check your pets for ticks. Remove ticks from pets the same way as you remove them from people. Ask your pet's  about the best product to use on your pet.    Remove a tick with tweezers.  Wear gloves. Grasp the tick as close to your skin as possible. Pull the tick straight up and out. Do not touch the tick with your bare hands. Check to make sure you removed the whole tick, including the head. Clean the area with soap and water or rubbing alcohol. Then wash your hands with soap and water.       Follow up with your doctor as directed:  Write down your questions so you remember to ask them during your visits.   © Copyright [employer] 2023 Information is for End User's use only and may not be sold, redistributed or otherwise used for commercial purposes.  The  above information is an  only. It is not intended as medical advice for individual conditions or treatments. Talk to your doctor, nurse or pharmacist before following any medical regimen to see if it is safe and effective for you.

## 2024-05-30 NOTE — ASSESSMENT & PLAN NOTE
Rash- likely ecm vs cellulitis. Pt currently without symptoms, no neck pain or headaches or joint pains or fatigue or flu like symptoms  Has history of lyme disease but has never had rash. No open areas. Start doxycycline 1 tab twice a day

## 2024-05-30 NOTE — ASSESSMENT & PLAN NOTE
Lost 7 pounds over the past 3 weeks since starting the medication. Tolerating the medication and hot flashes have improved since starting on the medication. Continue medication

## 2024-06-02 NOTE — TELEPHONE ENCOUNTER
Duplicate Prior Auth request / encounter please see telephone encounter from 04/26/24 regarding wegvoy PA. Please review patient's chart to see status of PA and to document anything regarding this medication in regards to anything regarding the authorization process etc before creating another encounter Thank You.

## 2024-06-12 DIAGNOSIS — A69.20 LYME DISEASE: Primary | ICD-10-CM

## 2024-06-12 RX ORDER — DOXYCYCLINE 100 MG/1
100 CAPSULE ORAL 2 TIMES DAILY
Qty: 20 CAPSULE | Refills: 0 | Status: SHIPPED | OUTPATIENT
Start: 2024-06-12 | End: 2024-06-22

## 2024-06-20 ENCOUNTER — CLINICAL SUPPORT (OUTPATIENT)
Dept: FAMILY MEDICINE CLINIC | Facility: CLINIC | Age: 54
End: 2024-06-20
Payer: COMMERCIAL

## 2024-06-20 DIAGNOSIS — E78.49 OTHER HYPERLIPIDEMIA: ICD-10-CM

## 2024-06-20 DIAGNOSIS — Z13.220 SCREENING FOR LIPID DISORDERS: ICD-10-CM

## 2024-06-20 DIAGNOSIS — Z13.0 SCREENING FOR DEFICIENCY ANEMIA: ICD-10-CM

## 2024-06-20 DIAGNOSIS — Z13.29 SCREENING FOR THYROID DISORDER: Primary | ICD-10-CM

## 2024-06-20 DIAGNOSIS — R53.83 FATIGUE, UNSPECIFIED TYPE: ICD-10-CM

## 2024-06-20 PROCEDURE — 36415 COLL VENOUS BLD VENIPUNCTURE: CPT | Performed by: NURSE PRACTITIONER

## 2024-06-21 LAB
ALBUMIN SERPL-MCNC: 4.3 G/DL (ref 3.8–4.9)
ALP SERPL-CCNC: 64 IU/L (ref 44–121)
ALT SERPL-CCNC: 15 IU/L (ref 0–32)
AST SERPL-CCNC: 20 IU/L (ref 0–40)
BASOPHILS # BLD AUTO: 0.1 X10E3/UL (ref 0–0.2)
BASOPHILS NFR BLD AUTO: 1 %
BILIRUB SERPL-MCNC: 0.4 MG/DL (ref 0–1.2)
BUN SERPL-MCNC: 19 MG/DL (ref 6–24)
BUN/CREAT SERPL: 23 (ref 9–23)
CALCIUM SERPL-MCNC: 9.1 MG/DL (ref 8.7–10.2)
CHLORIDE SERPL-SCNC: 106 MMOL/L (ref 96–106)
CHOLEST SERPL-MCNC: 203 MG/DL (ref 100–199)
CO2 SERPL-SCNC: 22 MMOL/L (ref 20–29)
CREAT SERPL-MCNC: 0.81 MG/DL (ref 0.57–1)
EGFR: 87 ML/MIN/1.73
EOSINOPHIL # BLD AUTO: 0.1 X10E3/UL (ref 0–0.4)
EOSINOPHIL NFR BLD AUTO: 3 %
ERYTHROCYTE [DISTWIDTH] IN BLOOD BY AUTOMATED COUNT: 12.9 % (ref 11.7–15.4)
GLOBULIN SER-MCNC: 1.9 G/DL (ref 1.5–4.5)
GLUCOSE SERPL-MCNC: 91 MG/DL (ref 70–99)
HCT VFR BLD AUTO: 38.1 % (ref 34–46.6)
HDLC SERPL-MCNC: 42 MG/DL
HGB BLD-MCNC: 12.6 G/DL (ref 11.1–15.9)
IMM GRANULOCYTES # BLD: 0 X10E3/UL (ref 0–0.1)
IMM GRANULOCYTES NFR BLD: 0 %
LDLC SERPL CALC-MCNC: 141 MG/DL (ref 0–99)
LDLC/HDLC SERPL: 3.4 RATIO (ref 0–3.2)
LYMPHOCYTES # BLD AUTO: 1.7 X10E3/UL (ref 0.7–3.1)
LYMPHOCYTES NFR BLD AUTO: 37 %
MCH RBC QN AUTO: 29.1 PG (ref 26.6–33)
MCHC RBC AUTO-ENTMCNC: 33.1 G/DL (ref 31.5–35.7)
MCV RBC AUTO: 88 FL (ref 79–97)
MONOCYTES # BLD AUTO: 0.4 X10E3/UL (ref 0.1–0.9)
MONOCYTES NFR BLD AUTO: 9 %
NEUTROPHILS # BLD AUTO: 2.4 X10E3/UL (ref 1.4–7)
NEUTROPHILS NFR BLD AUTO: 50 %
PLATELET # BLD AUTO: 273 X10E3/UL (ref 150–450)
POTASSIUM SERPL-SCNC: 3.9 MMOL/L (ref 3.5–5.2)
PROT SERPL-MCNC: 6.2 G/DL (ref 6–8.5)
RBC # BLD AUTO: 4.33 X10E6/UL (ref 3.77–5.28)
SL AMB VLDL CHOLESTEROL CALC: 20 MG/DL (ref 5–40)
SODIUM SERPL-SCNC: 141 MMOL/L (ref 134–144)
TRIGL SERPL-MCNC: 111 MG/DL (ref 0–149)
TSH SERPL DL<=0.005 MIU/L-ACNC: 1.77 UIU/ML (ref 0.45–4.5)
WBC # BLD AUTO: 4.7 X10E3/UL (ref 3.4–10.8)

## 2024-06-24 ENCOUNTER — HOSPITAL ENCOUNTER (OUTPATIENT)
Dept: MAMMOGRAPHY | Facility: IMAGING CENTER | Age: 54
Discharge: HOME | End: 2024-06-24
Payer: COMMERCIAL

## 2024-06-24 VITALS — WEIGHT: 178 LBS | HEIGHT: 64 IN | BODY MASS INDEX: 30.39 KG/M2

## 2024-06-24 DIAGNOSIS — Z12.31 ENCOUNTER FOR SCREENING MAMMOGRAM FOR MALIGNANT NEOPLASM OF BREAST: ICD-10-CM

## 2024-06-24 PROCEDURE — 77067 SCR MAMMO BI INCL CAD: CPT

## 2024-06-24 PROCEDURE — 77063 BREAST TOMOSYNTHESIS BI: CPT

## 2024-07-03 ENCOUNTER — OFFICE VISIT (OUTPATIENT)
Dept: FAMILY MEDICINE CLINIC | Facility: CLINIC | Age: 54
End: 2024-07-03
Payer: COMMERCIAL

## 2024-07-03 VITALS
SYSTOLIC BLOOD PRESSURE: 110 MMHG | OXYGEN SATURATION: 97 % | DIASTOLIC BLOOD PRESSURE: 64 MMHG | TEMPERATURE: 97.2 F | HEART RATE: 59 BPM | BODY MASS INDEX: 29.56 KG/M2 | HEIGHT: 65 IN | WEIGHT: 177.4 LBS

## 2024-07-03 DIAGNOSIS — E78.49 OTHER HYPERLIPIDEMIA: ICD-10-CM

## 2024-07-03 DIAGNOSIS — Z11.4 SCREENING FOR HIV (HUMAN IMMUNODEFICIENCY VIRUS): ICD-10-CM

## 2024-07-03 DIAGNOSIS — Z11.59 NEED FOR HEPATITIS C SCREENING TEST: ICD-10-CM

## 2024-07-03 DIAGNOSIS — Z12.4 SCREENING FOR CERVICAL CANCER: ICD-10-CM

## 2024-07-03 DIAGNOSIS — Z00.00 ANNUAL PHYSICAL EXAM: Primary | ICD-10-CM

## 2024-07-03 DIAGNOSIS — Z78.0 POST-MENOPAUSE: ICD-10-CM

## 2024-07-03 DIAGNOSIS — E66.09 CLASS 1 OBESITY DUE TO EXCESS CALORIES WITHOUT SERIOUS COMORBIDITY WITH BODY MASS INDEX (BMI) OF 31.0 TO 31.9 IN ADULT: ICD-10-CM

## 2024-07-03 PROCEDURE — 99396 PREV VISIT EST AGE 40-64: CPT | Performed by: NURSE PRACTITIONER

## 2024-07-03 RX ORDER — FAMOTIDINE 20 MG/1
20 TABLET, FILM COATED ORAL 2 TIMES DAILY
COMMUNITY

## 2024-07-03 NOTE — PATIENT INSTRUCTIONS
Schedule DEXA scan for bone density  Up to date on all other screenings- we will call and get your results of cervical cancer screen

## 2024-07-03 NOTE — PROGRESS NOTES
Adult Annual Physical  Name: Nilam Shukla      : 1970      MRN: [id number]  Encounter Provider: YAZMIN Chawla  Encounter Date: 7/3/2024   Encounter department: [facility]    Assessment & Plan   1. Annual physical exam  2. Screening for cervical cancer  3. Need for hepatitis C screening test  4. Screening for HIV (human immunodeficiency virus)  5. Post-menopause  -     DXA bone density spine hip and pelvis; Future; Expected date: 2024  6. Other hyperlipidemia  Assessment & Plan:  Elevated 2024  Recommend mediterranean diet   Continue with exercise  7. Class 1 obesity due to excess calories without serious comorbidity with body mass index (BMI) of 31.0 to 31.9 in adult  Assessment & Plan:  Discussed medications, risks and benefits discussed, side effects discussed of medication  Did not tolerate contrave and was started on compounded semaglutide. She is tolerating semaglutide well and will get her to dose of 2.4mg. She currently has no side effects from medication and has lost 14 lbs since starting medication. She is currently on 0.5mg but will increase to 1mg dose next week.   Continue dietary modifications  Qsymia is contraindicated due to glaucoma   Wegovy was denied originally but tried and failed contrave due to side effects. Qsymia is contraindication due to glaucoma  Orders:  -     Semaglutide-Weight Management (Wegovy) 2.4 MG/0.75ML; Inject 2.4 mg under the skin weekly    Immunizations and preventive care screenings were discussed with patient today. Appropriate education was printed on patient's after visit summary.    Counseling:  Alcohol/drug use: discussed moderation in alcohol intake, the recommendations for healthy alcohol use, and avoidance of illicit drug use.  Dental Health: discussed importance of regular tooth brushing, flossing, and dental visits.  Injury prevention: discussed safety/seat belts, safety helmets, smoke detectors, carbon dioxide  detectors, and smoking near bedding or upholstery.  Sexual health: discussed sexually transmitted diseases, partner selection, use of condoms, avoidance of unintended pregnancy, and contraceptive alternatives.  Exercise: the importance of regular exercise/physical activity was discussed. Recommend exercise 3-5 times per week for at least 30 minutes.          History of Present Illness   {Disappearing Hyperlinks I Encounters * My Last Note * Since Last Visit * History :98162}  Adult Annual Physical:  Patient presents for annual physical.   Labs 6/20/24: CBC, CMP, TSH, all normal  , , HDL 42,     Mammogram 6/24/24: normal, repeat 1 year  Colonoscopy 9/26/2020- repeat 10 years    She started on semaglutide after starting contrave- she was not able to tolerate Contrave due to dizziness. She is unable to take Qysmia due to glaucoma.   She started weight at 191 in April, since starting semaglutide today she is down to 177 (14lbs)  She is tolerating semaglutide well without side effects. She just finished her 8th injection she is at 0.5mg. Next week she will increase to 1mg dose without side effects. No constipation, nausea, vomiting.     Had pap in the last year at [facility]- will call to get results of pap.     Diet and Physical Activity:  - Diet/Nutrition: well balanced diet and consuming 3-5 servings of fruits/vegetables daily.  - Exercise: walking and strength training exercises. she is training to hike the [place] in October    Depression Screening:  - PHQ-2 Score: 0    General Health:  - Sleep: sleeps well.  - Hearing: normal hearing right ear and normal hearing left ear.  - Vision: goes for regular eye exams and wears glasses.  - Dental: regular dental visits.    /GYN Health:  - Follows with GYN: yes.   - Menopause: postmenopausal.   - Contraception:. hot flashes            Review of Systems   Constitutional:  Negative for chills and fever.   HENT: Negative.  Negative  "for ear pain and sore throat.    Eyes:  Negative for pain and visual disturbance.   Respiratory:  Negative for cough and shortness of breath.    Cardiovascular:  Positive for palpitations (occasionally will feel palpitations- has improved with weight loss). Negative for chest pain.   Gastrointestinal:  Negative for abdominal pain, constipation, diarrhea, nausea and vomiting.   Endocrine: Negative.    Genitourinary:  Negative for dysuria and hematuria.   Musculoskeletal:  Negative for arthralgias and back pain.   Skin:  Negative for color change and rash.   Neurological:  Negative for dizziness, seizures, syncope and headaches.   Psychiatric/Behavioral:  Negative for sleep disturbance.    All other systems reviewed and are negative.        Objective   {Disappearing Hyperlinks   Review Vitals * Enter New Vitals * Results Review * Labs * Imaging * Cardiology * Procedures * Lung Cancer Screening :46524}  /64   Pulse 59   Temp (!) 97.2 °F (36.2 °C) (Tympanic)   Ht 5' 4.57\" (1.64 m)   Wt 80.5 kg (177 lb 6.4 oz)   SpO2 97%   BMI 29.92 kg/m²     Physical Exam  Vitals and nursing note reviewed.   Constitutional:       General: She is not in acute distress.     Appearance: Normal appearance. She is well-developed.   HENT:      Head: Normocephalic and atraumatic.      Right Ear: Tympanic membrane, ear canal and external ear normal.      Left Ear: Tympanic membrane, ear canal and external ear normal.      Nose: Nose normal.      Mouth/Throat:      Mouth: Mucous membranes are moist.      Pharynx: Oropharynx is clear.   Eyes:      Conjunctiva/sclera: Conjunctivae normal.      Pupils: Pupils are equal, round, and reactive to light.   Neck:      Thyroid: No thyromegaly or thyroid tenderness.   Cardiovascular:      Rate and Rhythm: Normal rate and regular rhythm.      Pulses:           Radial pulses are 2+ on the right side and 2+ on the left side.      Heart sounds: Normal heart sounds. No murmur heard.  Pulmonary:     "  Effort: Pulmonary effort is normal. No respiratory distress.      Breath sounds: Normal breath sounds.   Abdominal:      General: Bowel sounds are normal.      Palpations: Abdomen is soft.      Tenderness: There is no abdominal tenderness.   Musculoskeletal:      Cervical back: Neck supple.      Right lower leg: No edema.      Left lower leg: No edema.   Lymphadenopathy:      Cervical: No cervical adenopathy.   Skin:     General: Skin is warm and dry.   Neurological:      Mental Status: She is alert and oriented to person, place, and time.   Psychiatric:         Mood and Affect: Mood normal.         Behavior: Behavior normal.       Administrative Statements {Disappearing Hyperlinks I  Level of Service * [facility]/PCSP:16189}

## 2024-07-09 RX ORDER — SEMAGLUTIDE 2.4 MG/.75ML
INJECTION, SOLUTION SUBCUTANEOUS
Qty: 3 ML | Refills: 0 | Status: SHIPPED | OUTPATIENT
Start: 2024-07-09

## 2024-07-11 ENCOUNTER — HOSPITAL ENCOUNTER (OUTPATIENT)
Dept: BONE DENSITY | Facility: IMAGING CENTER | Age: 54
Discharge: HOME | End: 2024-07-11
Payer: COMMERCIAL

## 2024-07-11 VITALS — BODY MASS INDEX: 29.09 KG/M2 | WEIGHT: 174.6 LBS | HEIGHT: 65 IN

## 2024-07-11 DIAGNOSIS — Z78.0 POST-MENOPAUSE: ICD-10-CM

## 2024-07-11 PROCEDURE — 77080 DXA BONE DENSITY AXIAL: CPT

## 2024-07-17 ENCOUNTER — TELEPHONE (OUTPATIENT)
Age: 54
End: 2024-07-17

## 2024-07-17 NOTE — TELEPHONE ENCOUNTER
PA for Wegovy 2.4mg Denied    Reason:(Screenshot if applicable)        Message sent to office clinical pool Yes    Denial letter scanned into Media Yes    Appeal started No ( Provider will need to decide if appeal is warranted and send clinical documentation to Prior Authorization Team for initiation.)    **Please follow up with your patient regarding denial and next steps**

## 2024-07-17 NOTE — TELEPHONE ENCOUNTER
PA for Wegovy 2.4 MG     Submitted via    []CMM-KEY   [x][place] Mailing-Case ID #: [id number]   []Faxed to plan   []Other website   []Phone call Case ID #     Office notes sent, clinical questions answered. Awaiting determination    Turnaround time for your insurance to make a decision on your Prior Authorization can take 7-21 business days.

## 2024-07-19 DIAGNOSIS — E66.09 CLASS 1 OBESITY DUE TO EXCESS CALORIES WITHOUT SERIOUS COMORBIDITY WITH BODY MASS INDEX (BMI) OF 32.0 TO 32.9 IN ADULT: ICD-10-CM

## 2024-08-08 ENCOUNTER — OFFICE VISIT (OUTPATIENT)
Dept: FAMILY MEDICINE CLINIC | Facility: CLINIC | Age: 54
End: 2024-08-08
Payer: COMMERCIAL

## 2024-08-08 VITALS
BODY MASS INDEX: 29.53 KG/M2 | HEART RATE: 77 BPM | DIASTOLIC BLOOD PRESSURE: 68 MMHG | TEMPERATURE: 97.9 F | HEIGHT: 64 IN | OXYGEN SATURATION: 98 % | SYSTOLIC BLOOD PRESSURE: 108 MMHG | WEIGHT: 173 LBS

## 2024-08-08 DIAGNOSIS — L25.9 CONTACT DERMATITIS, UNSPECIFIED CONTACT DERMATITIS TYPE, UNSPECIFIED TRIGGER: Primary | ICD-10-CM

## 2024-08-08 PROCEDURE — 99213 OFFICE O/P EST LOW 20 MIN: CPT | Performed by: NURSE PRACTITIONER

## 2024-08-08 RX ORDER — PREDNISONE 10 MG/1
TABLET ORAL
Qty: 30 TABLET | Refills: 0 | Status: SHIPPED | OUTPATIENT
Start: 2024-08-08 | End: 2024-08-20

## 2024-08-08 NOTE — PROGRESS NOTES
Ambulatory Visit  Name: Nilam Shukla      : 1970      MRN: [id number]  Encounter Provider: YAZMIN Chawla  Encounter Date: 2024   Encounter department: [facility]    Assessment & Plan   1. Contact dermatitis, unspecified contact dermatitis type, unspecified trigger  Assessment & Plan:  Prednisone taper decrease dose every 3 days  Cetirizine (Zrytec) 1-2x daily  Calamine lotion  Orders:  -     predniSONE 10 mg tablet; Take 4 tablets (40 mg total) by mouth daily for 3 days, THEN 3 tablets (30 mg total) daily for 3 days, THEN 2 tablets (20 mg total) daily for 3 days, THEN 1 tablet (10 mg total) daily for 3 days.     History of Present Illness     Here today for rash on abdomen- started on Saturday, tried clobetasol. Very itchy. Started whelts on lower abdomen and now yesterday with a patch with blisters on upper abdomen.     No new lotions, soaps, detergents.  She did travel but after rash developed  She has very sensitive skin  She has had weird rashes, welts in past            Review of Systems   Constitutional:  Negative for chills and fever.   HENT: Negative.  Negative for ear pain and sore throat.    Eyes:  Negative for pain and visual disturbance.   Respiratory:  Negative for cough and shortness of breath.    Cardiovascular:  Positive for palpitations (occasionally will feel palpitations- has improved with weight loss). Negative for chest pain.   Gastrointestinal:  Negative for abdominal pain, constipation, diarrhea, nausea and vomiting.   Endocrine: Negative.    Genitourinary:  Negative for dysuria and hematuria.   Musculoskeletal:  Negative for arthralgias and back pain.   Skin:  Positive for rash. Negative for color change.   Neurological:  Negative for dizziness, seizures, syncope and headaches.   Psychiatric/Behavioral:  Negative for sleep disturbance.    All other systems reviewed and are negative.      Objective     /68 (BP Location: Left arm, Patient  "Position: Sitting, Cuff Size: Standard)   Pulse 77   Temp 97.9 °F (36.6 °C) (Tympanic)   Ht 5' 4\" (1.626 m)   Wt 78.5 kg (173 lb)   SpO2 98%   BMI 29.70 kg/m²     Physical Exam  Vitals and nursing note reviewed.   Constitutional:       General: She is not in acute distress.     Appearance: Normal appearance. She is well-developed.   HENT:      Head: Normocephalic and atraumatic.   Eyes:      Pupils: Pupils are equal, round, and reactive to light.   Neck:      Thyroid: No thyromegaly or thyroid tenderness.   Cardiovascular:      Rate and Rhythm: Normal rate and regular rhythm.      Pulses:           Radial pulses are 2+ on the right side and 2+ on the left side.      Heart sounds: Normal heart sounds. No murmur heard.  Pulmonary:      Effort: Pulmonary effort is normal. No respiratory distress.      Breath sounds: Normal breath sounds.   Abdominal:      General: Bowel sounds are normal.      Palpations: Abdomen is soft.      Tenderness: There is no abdominal tenderness.   Musculoskeletal:      Cervical back: Neck supple.      Right lower leg: No edema.      Left lower leg: No edema.   Lymphadenopathy:      Cervical: No cervical adenopathy.   Skin:     General: Skin is warm and dry.      Findings: Rash (erythematous papules on left upper/central abdomen) present.   Neurological:      Mental Status: She is alert and oriented to person, place, and time.   Psychiatric:         Mood and Affect: Mood normal.         Behavior: Behavior normal.       Administrative Statements           "

## 2024-08-09 ENCOUNTER — TELEPHONE (OUTPATIENT)
Dept: ADMINISTRATIVE | Facility: OTHER | Age: 54
End: 2024-08-09

## 2024-08-09 NOTE — TELEPHONE ENCOUNTER
Upon review of the In Basket request we were able to locate, review, and update the patient chart as requested for CRC: Colonoscopy and DEXA Scan.    Any additional questions or concerns should be emailed to the Practice Liaisons via the appropriate education email address, please do not reply via In Basket.    Thank you  Maday Reveles MA   [facility]

## 2024-08-09 NOTE — LETTER
Procedure Request Form: Cervical Cancer Screening      Date Requested: 24  Patient: Nilam Shukla  Patient : 1970   Referring Provider: YAZMIN Chawla        Date of Procedure ______________________________       The above patient has informed us that they have completed their   most recent Cervical Cancer Screening at your facility. Please complete   this form and attach all corresponding procedure reports/results.    Comments __________________________________________________________  ____________________________________________________________________  ____________________________________________________________________  ____________________________________________________________________    Facility Completing Procedure _________________________________________    Form Completed By (print name) _______________________________________      Signature __________________________________________________________      These reports are needed for  compliance.    Please fax this completed form and a copy of the procedure report to our office located at [address] [place] [address] as soon as possible to Fax [phone] jaimie Nassar: Phone [phone]    We thank you for your assistance in treating our mutual patient.

## 2024-08-09 NOTE — LETTER
Diabetic Eye Exam Form    Date Requested: 24  Patient: Nilam Shukla  Patient : 1970   Referring Provider: YAZMIN Chawla      DIABETIC Eye Exam Date _______________________________      Type of Exam MUST be documented for Diabetic Eye Exams. Please CHECK ONE.     Retinal Exam       Dilated Retinal Exam       OCT       Optomap-Iris Exam      Fundus Photography       Left Eye - Please check Retinopathy or No Retinopathy        Exam did show retinopathy    Exam did not show retinopathy       Right Eye - Please check Retinopathy or No Retinopathy       Exam did show retinopathy    Exam did not show retinopathy       Comments __________________________________________________________    Practice Providing Exam ______________________________________________    Exam Performed By (print name) _______________________________________      Provider Signature ___________________________________________________      These reports are needed for  compliance.  Please fax this completed form and a copy of the Diabetic Eye Exam report to our office located at [address] [place] [address] as soon as possible via Fax [phone] attention Maday: Phone [phone]  We thank you for your assistance in treating our mutual patient.

## 2024-08-09 NOTE — TELEPHONE ENCOUNTER
Upon review of the In Basket request and the patient's chart, initial outreach has been made via fax to facility. Please see Contacts section for details.     Thank you  Maday Reveles MA    [FreeTextEntry1] : noted issue more likely a bladder neck issue and not BPH\par discussed use of alpha blocker  - trial for 6 months then stop.

## 2024-08-09 NOTE — TELEPHONE ENCOUNTER
----- Message from Brenda KIRK sent at 8/8/2024 11:48 AM EDT -----  Regarding: PAP  08/08/24 11:49 AM    Hello, our patient Nilam Shukla has had Pap Smear (HPV) aka Cervical Cancer Screening completed/performed. Please assist in updating the patient chart by making an External outreach to [facility] facility located in [place], [place]. The date of service is 2 or 1 year ago.    Thank you,  Brenda MELCHOR

## 2024-08-09 NOTE — TELEPHONE ENCOUNTER
OBGYN ltr sent to [facility] as per patient. NO DM but patient see [facility] in [place], [place]. [facility] stated not there patient.

## 2024-08-12 NOTE — TELEPHONE ENCOUNTER
Upon review of the In Basket request we were able to locate, review, and update the patient chart as requested for Pap Smear (HPV) aka Cervical Cancer Screening.    Any additional questions or concerns should be emailed to the Practice Liaisons via the appropriate education email address, please do not reply via In Basket.    Thank you  Maday Reveles MA   [facility]

## 2024-08-12 NOTE — TELEPHONE ENCOUNTER
NO Dm in chart but called [facility] and they will fax over last eye exam with the retina doctor. No letter sent just called since no DM. If it comes, we will scan into the media tab.

## 2024-08-23 ENCOUNTER — OFFICE VISIT (OUTPATIENT)
Dept: FAMILY MEDICINE CLINIC | Facility: CLINIC | Age: 54
End: 2024-08-23
Payer: COMMERCIAL

## 2024-08-23 VITALS
DIASTOLIC BLOOD PRESSURE: 70 MMHG | OXYGEN SATURATION: 99 % | BODY MASS INDEX: 29.02 KG/M2 | HEART RATE: 69 BPM | HEIGHT: 64 IN | SYSTOLIC BLOOD PRESSURE: 114 MMHG | TEMPERATURE: 98 F | WEIGHT: 170 LBS

## 2024-08-23 DIAGNOSIS — Z71.84 TRAVEL ADVICE ENCOUNTER: Primary | ICD-10-CM

## 2024-08-23 PROBLEM — L25.9 CONTACT DERMATITIS: Status: ACTIVE | Noted: 2024-08-23

## 2024-08-23 PROCEDURE — 99213 OFFICE O/P EST LOW 20 MIN: CPT

## 2024-08-23 RX ORDER — ACETAZOLAMIDE 125 MG/1
125 TABLET ORAL 2 TIMES DAILY
Qty: 30 TABLET | Refills: 0 | Status: SHIPPED | OUTPATIENT
Start: 2024-08-23

## 2024-08-23 NOTE — ASSESSMENT & PLAN NOTE
Prednisone taper decrease dose every 3 days  Cetirizine (Zrytec) 1-2x daily  Calamine lotion  
28-Sep-2018 09:20
28-Sep-2018 17:00
29-Sep-2018 11:11

## 2024-08-23 NOTE — PROGRESS NOTES
"Ambulatory Visit  Name: Nilam Shukla      : 1970      MRN: [id number]  Encounter Provider: Maribel Goldman DO  Encounter Date: 2024   Encounter department: [facility]    Assessment & Plan   1. Travel advice encounter  Assessment & Plan:  -will be traveling to [place] and [place] for hiking, camping in the fall    Plan:  Discussed the following recommendations:  Taking atleast 2 days to arrive at 2500m to 3000m from a low altitude  Ascending no more than 500m per day once you are at 2500m, and taking a full day to acclimitize for every additional 1000m of ascent.   Can take acetazolamide 125mg q12h starting the day before travel while at high altitudes   Orders:  -     acetaZOLAMIDE (DIAMOX) 125 mg tablet; Take 1 tablet (125 mg total) by mouth 2 (two) times a day       History of Present Illness     Pt presents to discuss starting a preventative for high altitude sickness on upcoming travels to the [place] and [place] where she will be camping and hiking. She does not have a history of high altitude sickness.        Review of Systems   Constitutional:  Negative for chills and fever.   HENT:  Negative for ear pain and sore throat.    Eyes:  Negative for pain and visual disturbance.   Respiratory:  Negative for cough and shortness of breath.    Cardiovascular:  Negative for chest pain and palpitations.   Gastrointestinal:  Negative for abdominal pain and vomiting.   Genitourinary:  Negative for dysuria and hematuria.   Musculoskeletal:  Negative for arthralgias and back pain.   Skin:  Negative for color change and rash.   Neurological:  Negative for seizures and syncope.   All other systems reviewed and are negative.      Objective     /70   Pulse 69   Temp 98 °F (36.7 °C) (Tympanic)   Ht 5' 4\" (1.626 m)   Wt 77.1 kg (170 lb)   SpO2 99%   BMI 29.18 kg/m²     Physical Exam  Constitutional:       General: She is not in acute distress.     Appearance: Normal " appearance. She is not ill-appearing or toxic-appearing.   HENT:      Head: Normocephalic and atraumatic.      Right Ear: External ear normal.      Left Ear: External ear normal.      Nose: Nose normal.   Eyes:      Conjunctiva/sclera: Conjunctivae normal.   Pulmonary:      Effort: Pulmonary effort is normal. No respiratory distress.   Musculoskeletal:         General: Normal range of motion.   Skin:     General: Skin is warm and dry.   Neurological:      General: No focal deficit present.      Mental Status: She is alert and oriented to person, place, and time.   Psychiatric:         Mood and Affect: Mood normal.         Behavior: Behavior normal.       Administrative Statements

## 2024-08-23 NOTE — ASSESSMENT & PLAN NOTE
-will be traveling to [place] and [place] for hiking, camping in the fall    Plan:  Discussed the following recommendations:  Taking atleast 2 days to arrive at 2500m to 3000m from a low altitude  Ascending no more than 500m per day once you are at 2500m, and taking a full day to acclimitize for every additional 1000m of ascent.   Can take acetazolamide 125mg q12h starting the day before travel while at high altitudes

## 2024-09-23 DIAGNOSIS — E66.09 CLASS 1 OBESITY DUE TO EXCESS CALORIES WITHOUT SERIOUS COMORBIDITY WITH BODY MASS INDEX (BMI) OF 31.0 TO 31.9 IN ADULT: Primary | ICD-10-CM

## 2024-09-23 DIAGNOSIS — E66.811 CLASS 1 OBESITY DUE TO EXCESS CALORIES WITHOUT SERIOUS COMORBIDITY WITH BODY MASS INDEX (BMI) OF 31.0 TO 31.9 IN ADULT: Primary | ICD-10-CM

## 2024-09-24 ENCOUNTER — OFFICE VISIT (OUTPATIENT)
Dept: FAMILY MEDICINE CLINIC | Facility: CLINIC | Age: 54
End: 2024-09-24
Payer: COMMERCIAL

## 2024-09-24 VITALS
BODY MASS INDEX: 28.17 KG/M2 | HEIGHT: 64 IN | WEIGHT: 165 LBS | DIASTOLIC BLOOD PRESSURE: 80 MMHG | OXYGEN SATURATION: 98 % | SYSTOLIC BLOOD PRESSURE: 106 MMHG | TEMPERATURE: 96.9 F | HEART RATE: 67 BPM

## 2024-09-24 DIAGNOSIS — H92.01 ACUTE OTALGIA, RIGHT: Primary | ICD-10-CM

## 2024-09-24 DIAGNOSIS — E66.09 CLASS 1 OBESITY DUE TO EXCESS CALORIES WITHOUT SERIOUS COMORBIDITY WITH BODY MASS INDEX (BMI) OF 31.0 TO 31.9 IN ADULT: ICD-10-CM

## 2024-09-24 DIAGNOSIS — E66.811 CLASS 1 OBESITY DUE TO EXCESS CALORIES WITHOUT SERIOUS COMORBIDITY WITH BODY MASS INDEX (BMI) OF 31.0 TO 31.9 IN ADULT: ICD-10-CM

## 2024-09-24 PROCEDURE — 99213 OFFICE O/P EST LOW 20 MIN: CPT | Performed by: NURSE PRACTITIONER

## 2024-09-24 NOTE — PROGRESS NOTES
"Ambulatory Visit  Name: Nilam Shukla      : 1970      MRN: [id number]  Encounter Provider: YAZMIN Chawla  Encounter Date: 2024   Encounter department: [facility]    Assessment & Plan  Acute otalgia, right  No evidence of infection  Recommend hydration, allergy regimen, tylenol or motrin       Class 1 obesity due to excess calories without serious comorbidity with body mass index (BMI) of 31.0 to 31.9 in adult  Continued success with semaglutide  Continue current medication regimen and dosing  Very close to goal weight              History of Present Illness       Here today for right ear pain.    Right ear pain started last night and into throat on right side. No muffled hearing. No recent URI symptoms, just flew down to [place] and back on Saturday.     She is taking 0.25ml of semaglutide weekly the higher the dose she had food eversion she is still losing weight, feels she is maintaining            Review of Systems   Constitutional: Negative.    HENT:  Positive for dental problem (jaw pain on right side), ear pain and sore throat (on right). Negative for hearing loss and trouble swallowing.    Respiratory:  Negative for cough and shortness of breath.    Cardiovascular:  Negative for chest pain and palpitations.   Gastrointestinal: Negative.    Neurological: Negative.            Objective     /80 (BP Location: Left arm, Patient Position: Sitting, Cuff Size: Standard)   Pulse 67   Temp (!) 96.9 °F (36.1 °C) (Tympanic)   Ht 5' 4\" (1.626 m)   Wt 74.8 kg (165 lb)   SpO2 98%   BMI 28.32 kg/m²     Physical Exam  Constitutional:       General: She is not in acute distress.     Appearance: Normal appearance. She is not ill-appearing or toxic-appearing.   HENT:      Head: Normocephalic and atraumatic.      Right Ear: Tympanic membrane, ear canal and external ear normal.      Left Ear: Tympanic membrane, ear canal and external ear normal.      Nose: Nose normal.      " Mouth/Throat:      Mouth: Mucous membranes are moist.      Pharynx: Oropharynx is clear.   Eyes:      Conjunctiva/sclera: Conjunctivae normal.      Pupils: Pupils are equal, round, and reactive to light.   Cardiovascular:      Rate and Rhythm: Normal rate and regular rhythm.      Heart sounds: Normal heart sounds.   Pulmonary:      Effort: Pulmonary effort is normal. No respiratory distress.      Breath sounds: Normal breath sounds.   Musculoskeletal:         General: Normal range of motion.   Skin:     General: Skin is warm and dry.   Neurological:      General: No focal deficit present.      Mental Status: She is alert and oriented to person, place, and time.   Psychiatric:         Mood and Affect: Mood normal.         Behavior: Behavior normal.

## 2024-09-25 DIAGNOSIS — H66.001 NON-RECURRENT ACUTE SUPPURATIVE OTITIS MEDIA OF RIGHT EAR WITHOUT SPONTANEOUS RUPTURE OF TYMPANIC MEMBRANE: Primary | ICD-10-CM

## 2024-09-25 RX ORDER — AMOXICILLIN 875 MG
875 TABLET ORAL 2 TIMES DAILY
Qty: 20 TABLET | Refills: 0 | Status: SHIPPED | OUTPATIENT
Start: 2024-09-25 | End: 2024-09-26

## 2024-09-27 ENCOUNTER — OFFICE VISIT (OUTPATIENT)
Dept: PODIATRY | Facility: CLINIC | Age: 54
End: 2024-09-27
Payer: COMMERCIAL

## 2024-09-27 VITALS
BODY MASS INDEX: 28.07 KG/M2 | DIASTOLIC BLOOD PRESSURE: 84 MMHG | HEIGHT: 64 IN | HEART RATE: 76 BPM | WEIGHT: 164.4 LBS | SYSTOLIC BLOOD PRESSURE: 125 MMHG

## 2024-09-27 DIAGNOSIS — M21.622 TAILOR'S BUNION OF BOTH FEET: Primary | ICD-10-CM

## 2024-09-27 DIAGNOSIS — M79.672 PAIN IN LEFT FOOT: ICD-10-CM

## 2024-09-27 DIAGNOSIS — M79.671 PAIN OF RIGHT FOOT: ICD-10-CM

## 2024-09-27 DIAGNOSIS — M21.621 TAILOR'S BUNION OF BOTH FEET: Primary | ICD-10-CM

## 2024-09-27 PROCEDURE — 99202 OFFICE O/P NEW SF 15 MIN: CPT | Performed by: PODIATRIST

## 2024-09-29 ENCOUNTER — AMB VIDEO VISIT (OUTPATIENT)
Dept: OTHER | Facility: HOSPITAL | Age: 54
End: 2024-09-29

## 2024-09-29 DIAGNOSIS — B02.9 HERPES ZOSTER WITHOUT COMPLICATION: Primary | ICD-10-CM

## 2024-09-29 PROCEDURE — ECARE PR SL URGENT CARE VIRTUAL VISIT: Performed by: NURSE PRACTITIONER

## 2024-09-29 RX ORDER — PREDNISONE 10 MG/1
TABLET ORAL
Qty: 30 TABLET | Refills: 0 | Status: SHIPPED | OUTPATIENT
Start: 2024-09-29 | End: 2024-10-09

## 2024-09-29 NOTE — CARE ANYWHERE EVISITS
Visit Summary for SENTHIL SAAVEDRA - Gender: Female - Date of Birth: 1970  Date: [id number] - Duration: 19 minutes  Patient: SENTHIL SAAVEDRA  Provider: Amelia ARCE    Patient Contact Information  Address  [address] [place] [id number]  [phone]    Visit Topics    Triage Questions   What is your current physical address in the event of a medical emergency? Answer []  Are you allergic to any medications? Answer []  Are you now or could you be pregnant? Answer []  Do you have any immune system compromise or chronic lung   disease? Answer []  Do you have any vulnerable family members in the home (infant, pregnant, cancer, elderly)? Answer []     Conversation Transcripts  [0A][0A] [Notification] You are connected with Amelia ARCE, Urgent Care Specialist.[0A][Notification] SENTHIL SAAVEDRA is located in [place].[0A][Notification] SENTHIL SAAVEDRA has shared health history...[0A]    Diagnosis  Zoster without complications    Procedures  Value: [id number] Code: CPT-4 UNLISTED E&M SERVICE    Medications Prescribed    No prescriptions ordered    Electronically signed by: Amelia Moon(NPI [id number])

## 2024-09-29 NOTE — PATIENT INSTRUCTIONS
Will start prednisone.  Continue antiviral.  Monitor symptoms.  Follow up with PCP tomorrow.  Go to ER with any worsening symptoms, facial paralysis  or change in vision.  If you notice any lesion in the eye or change in vision go to eye doctor.

## 2024-09-29 NOTE — PROGRESS NOTES
Virtual Regular Visit  Name: Nilam Shukla      : 1970      MRN: [id number]  Encounter Provider: YAZMIN Sanchez  Encounter Date: 2024   Encounter department: VIRTUAL CARE     Verification of patient location:    Patient is located at Home in the following state in which I hold an active license [place]    Assessment & Plan  Herpes zoster without complication    Orders:    predniSONE 10 mg tablet; Take 5 tablets (50 mg total) by mouth daily for 2 days, THEN 4 tablets (40 mg total) daily for 2 days, THEN 3 tablets (30 mg total) daily for 2 days, THEN 2 tablets (20 mg total) daily for 2 days, THEN 1 tablet (10 mg total) daily for 2 days.         Encounter provider YAZMIN Sanchez    The patient was identified by name and date of birth. Nilam Shukla was informed that this is a telemedicine visit and that the visit is being conducted through the [employer] platform. She agrees to proceed..  My office door was closed. No one else was in the room.  She acknowledged consent and understanding of privacy and security of the video platform. The patient has agreed to participate and understands they can discontinue the visit at any time.    Patient is aware this is a billable service.     History of Present Illness     This is a 54 year old female here today for video visit.  She started with ear pain last week, she was started on antibiotic but the symptoms got worse.  She was then started on Valtrex for shingles. She states she was started on valtrex on 3 days ago.  She started to have shooting pain and muffled hearing which she states her PCP is aware of..  She states she is having some pain into her jaw.  She states it is hard to open her jaw.  She does have some numbness.  She states she is wondering if she should start prednisone to see if this helps with symptoms. She denies any change in vision.  There is not facial paralysis. She states she does have a message out to her PCP.         History  "obtained from : patient  Review of Systems   Constitutional:  Negative for activity change, chills, fatigue and fever.   Respiratory: Negative.     Cardiovascular: Negative.    Skin:  Positive for rash.   Neurological:  Positive for numbness. Negative for weakness.   Psychiatric/Behavioral: Negative.       Current Outpatient Medications on File Prior to Visit   Medication Sig Dispense Refill    acetaZOLAMIDE (DIAMOX) 125 mg tablet Take 1 tablet (125 mg total) by mouth 2 (two) times a day 30 tablet 0    famotidine (PEPCID) 20 mg tablet Take 20 mg by mouth 2 (two) times a day      gabapentin (Neurontin) 100 mg capsule Take 1 capsule (100 mg total) by mouth 3 (three) times a day For nerve pain 30 capsule 0    SEMAGLUTIDE, 1 MG/DOSE, SC       Semaglutide-Weight Management (Wegovy) 2.4 MG/0.75ML Inject 2.4 mg under the skin weekly (Patient not taking: Reported on 8/23/2024) 3 mL 0    Syringe/Needle, Disp, (Syringe Luer Slip) 27G X 1/2\" 1 ML MISC Use once a week For compounded semaglutide injection weekly 50 each 0    valACYclovir (VALTREX) 1,000 mg tablet Take 1 tablet (1,000 mg total) by mouth 3 (three) times a day for 7 days 21 tablet 0     No current facility-administered medications on file prior to visit.          Objective     There were no vitals taken for this visit.  Physical Exam  Constitutional:       Appearance: Normal appearance.   HENT:      Head: Normocephalic and atraumatic.        Comments: Smile is equal, able to raise eyebrow,.  No asymmetry noted.   Scattered erythemic lesions on right side of face.   Neurological:      Mental Status: She is alert and oriented to person, place, and time.   Psychiatric:         Mood and Affect: Mood normal.         Behavior: Behavior normal.         Thought Content: Thought content normal.         Judgment: Judgment normal.         Visit Time  Total Visit Duration: 18        "

## 2024-09-30 DIAGNOSIS — B02.9 HERPES ZOSTER WITHOUT COMPLICATION: ICD-10-CM

## 2024-09-30 RX ORDER — GABAPENTIN 100 MG/1
CAPSULE ORAL
Qty: 210 CAPSULE | Refills: 0 | Status: SHIPPED | OUTPATIENT
Start: 2024-09-30 | End: 2024-10-30

## 2024-09-30 NOTE — ASSESSMENT & PLAN NOTE
Continued success with semaglutide  Continue current medication regimen and dosing  Very close to goal weight

## 2024-09-30 NOTE — PROGRESS NOTES
Ambulatory Visit  Name: Nilam Shukla      : 1970      MRN: [id number]  Encounter Provider: Ronald Baer DPM  Encounter Date: 2024   Encounter department: [facility] [place]    Assessment & Plan  Tailor's bunion of both feet         Pain of right foot         Pain in left foot         Treatment options discussed regarding clinical deformity which has caused her significant pain and discomfort.  Conservative options and surgical options discussed.  Nothing will resolve the clinical deformity short of a surgical intervention which would require an osteotomy of the fifth metatarsal.  This would involve a postoperative course that will not work with her current situation but may be an option in the future.  Recommend conservative treatment plan at this point using felt accommodative adhesive material to offload the fifth MPJ at his dorsal lateral aspect bilateral.  If surgical correction is to be considered patient will require bilateral x-rays.  Follow-up as needed.      History of Present Illness     Nilam Shukla is a 54 y.o. female who presents with painful bilateral fifth toe joints.  Patient reports doing some extended hiking and one side blistered, but both rub significantly in shoes causing her pain that limits and restrict her walking.  Would like to know what her treatment options are and how she can have less pain as she is planning to go on another extended hiking trip in the near future.    History obtained from : patient  Review of Systems   Constitutional: Negative.    HENT: Negative.     Eyes: Negative.    Respiratory: Negative.     Cardiovascular: Negative.    Gastrointestinal: Negative.    Endocrine: Negative.    Genitourinary: Negative.    Musculoskeletal:  Positive for arthralgias and joint swelling.        Painful bilateral fifth toe joints   Skin: Negative.    Allergic/Immunologic: Negative.    Neurological: Negative.    Hematological: Negative.    Psychiatric/Behavioral:  "Negative.       Past Medical History   Past Medical History:   Diagnosis Date    BRCA1 negative     BRCA2 negative     Bunion     Outside of foot    Dizziness     Ear problems     Migraine     Nasal congestion     Tinnitus      Past Surgical History:   Procedure Laterality Date    ENDOMETRIAL ABLATION  2001    LAPAROSCOPIC OVARIAN CYSTECTOMY Left 2001    LIPOMA RESECTION Left 2003    OOPHORECTOMY Bilateral 2019    TUBAL LIGATION  2019    UMBILICAL HERNIA REPAIR  2003    WISDOM TOOTH EXTRACTION  1988     Family History   Problem Relation Age of Onset    Ovarian cancer Mother 65    Glaucoma Mother     Cancer Mother         Ovarian, lung    Skin cancer Mother         80's-multiple skin ca    Lung cancer Mother         Late 80's    Arthritis Mother     Coronary artery disease Father     Diabetes Father     No Known Problems Daughter     No Known Problems Maternal Grandmother     Prostate cancer Maternal Grandfather     No Known Problems Paternal Grandmother     No Known Problems Paternal Grandfather     Diabetes Brother     Diabetes Brother     Cirrhosis Brother     Alcohol abuse Brother     No Known Problems Maternal Aunt     No Known Problems Maternal Aunt     No Known Problems Paternal Aunt     No Known Problems Paternal Aunt     No Known Problems Paternal Aunt     Colon cancer Neg Hx     Rectal cancer Neg Hx     Colon polyps Neg Hx      Current Outpatient Medications on File Prior to Visit   Medication Sig Dispense Refill    acetaZOLAMIDE (DIAMOX) 125 mg tablet Take 1 tablet (125 mg total) by mouth 2 (two) times a day 30 tablet 0    famotidine (PEPCID) 20 mg tablet Take 20 mg by mouth 2 (two) times a day      SEMAGLUTIDE, 1 MG/DOSE, SC       Syringe/Needle, Disp, (Syringe Luer Slip) 27G X 1/2\" 1 ML MISC Use once a week For compounded semaglutide injection weekly 50 each 0    valACYclovir (VALTREX) 1,000 mg tablet Take 1 tablet (1,000 mg total) by mouth 3 (three) times a day for 7 days 21 tablet 0    [DISCONTINUED] " "gabapentin (Neurontin) 100 mg capsule Take 1 capsule (100 mg total) by mouth 3 (three) times a day For nerve pain 30 capsule 0    Semaglutide-Weight Management (Wegovy) 2.4 MG/0.75ML Inject 2.4 mg under the skin weekly (Patient not taking: Reported on 8/23/2024) 3 mL 0     No current facility-administered medications on file prior to visit.     Allergies   Allergen Reactions    Clindamycin Rash    Doxycycline GI Intolerance     This is not a real allergy.           Objective     /84 (BP Location: Left arm, Patient Position: Sitting, Cuff Size: Standard)   Pulse 76   Ht 5' 4\" (1.626 m) Comment: stated  Wt 74.6 kg (164 lb 6.4 oz)   BMI 28.22 kg/m²     Physical Exam  Vitals and nursing note reviewed.   Constitutional:       General: She is not in acute distress.     Appearance: Normal appearance. She is well-developed.   HENT:      Head: Normocephalic and atraumatic.      Nose: Nose normal.   Eyes:      Conjunctiva/sclera: Conjunctivae normal.   Cardiovascular:      Rate and Rhythm: Normal rate and regular rhythm.   Pulmonary:      Effort: Pulmonary effort is normal. No respiratory distress.   Musculoskeletal:         General: Tenderness and deformity present. No swelling.      Cervical back: Neck supple.      Right foot: Bunion (Tailor's) present.      Left foot: Bunion (Tailor's) present.      Comments: Bilateral fifth MPJs noted to be extremely prominent and signs of irritation/inflammation from shoe gear rubbing.  Moderate tailor's bunion deformity noted.  Mild tenderness with palpation.   Feet:      Right foot:      Protective Sensation: 10 sites tested.  10 sites sensed.      Skin integrity: Skin integrity normal.      Left foot:      Protective Sensation: 10 sites tested.  10 sites sensed.      Skin integrity: Skin integrity normal.   Skin:     General: Skin is warm and dry.      Capillary Refill: Capillary refill takes 2 to 3 seconds.   Neurological:      General: No focal deficit present.      Mental " Status: She is alert.   Psychiatric:         Mood and Affect: Mood normal.

## 2024-10-03 ENCOUNTER — NURSE TRIAGE (OUTPATIENT)
Age: 54
End: 2024-10-03

## 2024-10-03 ENCOUNTER — OFFICE VISIT (OUTPATIENT)
Dept: URGENT CARE | Facility: CLINIC | Age: 54
End: 2024-10-03
Payer: COMMERCIAL

## 2024-10-03 ENCOUNTER — TELEPHONE (OUTPATIENT)
Age: 54
End: 2024-10-03

## 2024-10-03 VITALS
HEART RATE: 93 BPM | RESPIRATION RATE: 20 BRPM | WEIGHT: 165.4 LBS | OXYGEN SATURATION: 98 % | HEIGHT: 64 IN | TEMPERATURE: 98.5 F | BODY MASS INDEX: 28.24 KG/M2

## 2024-10-03 DIAGNOSIS — N39.0 URINARY TRACT INFECTION WITH HEMATURIA, SITE UNSPECIFIED: Primary | ICD-10-CM

## 2024-10-03 DIAGNOSIS — R31.9 URINARY TRACT INFECTION WITH HEMATURIA, SITE UNSPECIFIED: Primary | ICD-10-CM

## 2024-10-03 LAB
SL AMB  POCT GLUCOSE, UA: NEGATIVE
SL AMB LEUKOCYTE ESTERASE,UA: ABNORMAL
SL AMB POCT BILIRUBIN,UA: NEGATIVE
SL AMB POCT BLOOD,UA: ABNORMAL
SL AMB POCT CLARITY,UA: ABNORMAL
SL AMB POCT COLOR,UA: ABNORMAL
SL AMB POCT KETONES,UA: 15
SL AMB POCT NITRITE,UA: POSITIVE
SL AMB POCT PH,UA: 6
SL AMB POCT SPECIFIC GRAVITY,UA: 1.02
SL AMB POCT URINE PROTEIN: 2000
SL AMB POCT UROBILINOGEN: 0.2

## 2024-10-03 PROCEDURE — 99213 OFFICE O/P EST LOW 20 MIN: CPT | Performed by: NURSE PRACTITIONER

## 2024-10-03 PROCEDURE — 81002 URINALYSIS NONAUTO W/O SCOPE: CPT | Performed by: NURSE PRACTITIONER

## 2024-10-03 RX ORDER — SULFAMETHOXAZOLE/TRIMETHOPRIM 800-160 MG
1 TABLET ORAL EVERY 12 HOURS SCHEDULED
Qty: 10 TABLET | Refills: 0 | Status: SHIPPED | OUTPATIENT
Start: 2024-10-03 | End: 2024-10-09

## 2024-10-03 NOTE — PROGRESS NOTES
"  [facility]        NAME: Nilam Shukla is a 54 y.o. female  : 1970    MRN: [id number]  DATE: October 3, 2024  TIME: 12:11 PM    Assessment and Plan   Urinary tract infection with hematuria, site unspecified [N39.0, R31.9]  1. Urinary tract infection with hematuria, site unspecified  POCT urine dip    Urine culture    sulfamethoxazole-trimethoprim (BACTRIM DS) 800-160 mg per tablet            Patient Instructions       Urine dip is positive for blood, leuks esterace and nitrite  Will send for culture  In meantime, start antibiotics  Follow up with PCP in 3-5 days.  Proceed to  ER if symptoms worsen.    If tests have been performed at [facility], our office will contact you with results if changes need to be made to the care plan discussed with you at the visit.  You can review your full results on [facility] MyChart.    Chief Complaint     Chief Complaint   Patient presents with    Possible UTI     Started this morning with urgency, frequency, there was blood in her urine, burning, and cloudy urine          History of Present Illness       HPI  Reports the UTI symptoms started this 0830 this morning, 3 hours ago.  Reports urgency, burning, fresh blood in urine (\"katy\" colored), multiple clots (ranging in color), and minimal urinary output. Patient reports urgency every 5-10 minutes. Reports history of UTI's when she was younger. Patient finishing shingles treatment (antivirals and tapering off of prednisone). Reports working out consistently for [place] [employer] run coming up the end of this month took time off while recovering from shingles and did some lounges this morning. Reports feeling shaky and weak this morning. Denies OTC treatment for currently symptoms.     Review of Systems   Review of Systems   Constitutional:  Negative for fatigue and fever.   Gastrointestinal:  Negative for abdominal pain, blood in stool, constipation, diarrhea, nausea and vomiting.   Genitourinary:  Positive for decreased " "urine volume, difficulty urinating, dysuria, frequency, hematuria and urgency. Negative for flank pain, menstrual problem, pelvic pain, vaginal bleeding, vaginal discharge and vaginal pain.        Difficulty to start urinating         Current Medications       Current Outpatient Medications:     sulfamethoxazole-trimethoprim (BACTRIM DS) 800-160 mg per tablet, Take 1 tablet by mouth every 12 (twelve) hours for 5 days, Disp: 10 tablet, Rfl: 0    acetaZOLAMIDE (DIAMOX) 125 mg tablet, Take 1 tablet (125 mg total) by mouth 2 (two) times a day, Disp: 30 tablet, Rfl: 0    famotidine (PEPCID) 20 mg tablet, Take 20 mg by mouth 2 (two) times a day, Disp: , Rfl:     gabapentin (Neurontin) 100 mg capsule, Take 2 capsules (200 mg total) by mouth 2 (two) times a day AND 3 capsules (300 mg total) daily at bedtime. For nerve pain., Disp: 210 capsule, Rfl: 0    predniSONE 10 mg tablet, Take 5 tablets (50 mg total) by mouth daily for 2 days, THEN 4 tablets (40 mg total) daily for 2 days, THEN 3 tablets (30 mg total) daily for 2 days, THEN 2 tablets (20 mg total) daily for 2 days, THEN 1 tablet (10 mg total) daily for 2 days., Disp: 30 tablet, Rfl: 0    SEMAGLUTIDE, 1 MG/DOSE, SC, , Disp: , Rfl:     Semaglutide-Weight Management (Wegovy) 2.4 MG/0.75ML, Inject 2.4 mg under the skin weekly (Patient not taking: Reported on 8/23/2024), Disp: 3 mL, Rfl: 0    Syringe/Needle, Disp, (Syringe Luer Slip) 27G X 1/2\" 1 ML MISC, Use once a week For compounded semaglutide injection weekly, Disp: 50 each, Rfl: 0    valACYclovir (VALTREX) 1,000 mg tablet, Take 1 tablet (1,000 mg total) by mouth 3 (three) times a day for 7 days, Disp: 21 tablet, Rfl: 0    Current Allergies     Allergies as of 10/03/2024 - Reviewed 10/03/2024   Allergen Reaction Noted    Clindamycin Rash 11/08/2013    Doxycycline GI Intolerance 11/08/2013            The following portions of the patient's history were reviewed and updated as appropriate: allergies, current " "medications, past family history, past medical history, past social history, past surgical history and problem list.     Past Medical History:   Diagnosis Date    BRCA1 negative     BRCA2 negative     Bunion     Outside of foot    Dizziness     Ear problems     Migraine     Nasal congestion     Tinnitus        Past Surgical History:   Procedure Laterality Date    ENDOMETRIAL ABLATION  2001    LAPAROSCOPIC OVARIAN CYSTECTOMY Left 2001    LIPOMA RESECTION Left 2003    OOPHORECTOMY Bilateral 2019    TUBAL LIGATION  2019    UMBILICAL HERNIA REPAIR  2003    WISDOM TOOTH EXTRACTION  1988       Family History   Problem Relation Age of Onset    Ovarian cancer Mother 65    Glaucoma Mother     Cancer Mother         Ovarian, lung    Skin cancer Mother         80's-multiple skin ca    Lung cancer Mother         Late 80's    Arthritis Mother     Coronary artery disease Father     Diabetes Father     No Known Problems Daughter     No Known Problems Maternal Grandmother     Prostate cancer Maternal Grandfather     No Known Problems Paternal Grandmother     No Known Problems Paternal Grandfather     Diabetes Brother     Diabetes Brother     Cirrhosis Brother     Alcohol abuse Brother     No Known Problems Maternal Aunt     No Known Problems Maternal Aunt     No Known Problems Paternal Aunt     No Known Problems Paternal Aunt     No Known Problems Paternal Aunt     Colon cancer Neg Hx     Rectal cancer Neg Hx     Colon polyps Neg Hx          Medications have been verified.        Objective   Pulse 93   Temp 98.5 °F (36.9 °C) (Tympanic)   Resp 20   Ht 5' 4\" (1.626 m)   Wt 75 kg (165 lb 6.4 oz)   SpO2 98%   BMI 28.39 kg/m²   No LMP recorded. (Menstrual status: Oopherectomy).       Physical Exam     Physical Exam  Constitutional:       General: She is not in acute distress.     Appearance: She is not ill-appearing.   Cardiovascular:      Rate and Rhythm: Normal rate and regular rhythm.   Pulmonary:      Effort: Pulmonary effort " is normal.      Breath sounds: Normal breath sounds.   Abdominal:      General: Abdomen is flat. Bowel sounds are normal.      Palpations: Abdomen is soft.      Tenderness: There is no abdominal tenderness. There is no right CVA tenderness, left CVA tenderness or guarding.   Neurological:      Mental Status: She is alert.

## 2024-10-03 NOTE — TELEPHONE ENCOUNTER
"Pt called with blood in their urine. Pt states she went twice so far today and they had blood clots and the water was bright red. Pt also made note that she has some burning with urination and urgency/frequency.     Recommended to be seen in office today, however, no available appointments until tomorrow. Recommended pt go to UC today to be evaluated. Pt agreed and said she would go to UC.         Reason for Disposition   Pain or burning with passing urine (urination)    Answer Assessment - Initial Assessment Questions  1. COLOR of URINE: \"Describe the color of the urine.\"  (e.g., tea-colored, pink, red, blood clots, bloody)      Small blood clots, bright red/cloudy in the water   2. ONSET: \"When did the bleeding start?\"       This morning   3. EPISODES: \"How many times has there been blood in the urine?\" or \"How many times today?\"      Twice   4. PAIN with URINATION: \"Is there any pain with passing your urine?\" If Yes, ask: \"How bad is the pain?\"  (Scale 1-10; or mild, moderate, severe)     - MILD - complains slightly about urination hurting     - MODERATE - interferes with normal activities       - SEVERE - excruciating, unwilling or unable to urinate because of the pain       Burning   5. FEVER: \"Do you have a fever?\" If Yes, ask: \"What is your temperature, how was it measured, and when did it start?\"      Denies-but does feel weak and shakey   6. ASSOCIATED SYMPTOMS: \"Are you passing urine more frequently than usual?\"      Yes   7. OTHER SYMPTOMS: \"Do you have any other symptoms?\" (e.g., back/flank pain, abdominal pain, vomiting)      Urgency    Protocols used: Urine - Blood In-ADULT-OH    "

## 2024-10-03 NOTE — TELEPHONE ENCOUNTER
Patient called reporting acute onset of pressure when urinating, blood clot with darker red cloudiness, then 5 mins later, urge to go, no urine but had several blood clots and bright red bowl water. Warm transferred to triage nurse

## 2024-10-07 DIAGNOSIS — N39.0 URINARY TRACT INFECTION WITH HEMATURIA, SITE UNSPECIFIED: Primary | ICD-10-CM

## 2024-10-07 DIAGNOSIS — R31.9 URINARY TRACT INFECTION WITH HEMATURIA, SITE UNSPECIFIED: Primary | ICD-10-CM

## 2024-10-07 LAB
BACTERIA UR CULT: ABNORMAL
Lab: ABNORMAL
SL AMB ANTIMICROBIAL SUSCEPTIBILITY: ABNORMAL

## 2024-10-07 RX ORDER — NITROFURANTOIN 25; 75 MG/1; MG/1
100 CAPSULE ORAL 2 TIMES DAILY
Qty: 10 CAPSULE | Refills: 0 | Status: SHIPPED | OUTPATIENT
Start: 2024-10-07 | End: 2024-10-10

## 2024-10-09 ENCOUNTER — EVALUATION (OUTPATIENT)
Dept: PHYSICAL THERAPY | Facility: CLINIC | Age: 54
End: 2024-10-09
Payer: COMMERCIAL

## 2024-10-09 DIAGNOSIS — R42 DIZZINESS: ICD-10-CM

## 2024-10-09 DIAGNOSIS — R42 VERTIGO: Primary | ICD-10-CM

## 2024-10-09 DIAGNOSIS — H81.11 BENIGN PAROXYSMAL POSITIONAL VERTIGO OF RIGHT EAR: ICD-10-CM

## 2024-10-09 DIAGNOSIS — N39.0 URINARY TRACT INFECTION WITHOUT HEMATURIA, SITE UNSPECIFIED: Primary | ICD-10-CM

## 2024-10-09 PROCEDURE — 97162 PT EVAL MOD COMPLEX 30 MIN: CPT | Performed by: PHYSICAL THERAPIST

## 2024-10-09 PROCEDURE — 97110 THERAPEUTIC EXERCISES: CPT | Performed by: PHYSICAL THERAPIST

## 2024-10-09 NOTE — PROGRESS NOTES
PT Evaluation     Today's date: 10/9/2024  Patient name: Nilam Shukla  : 1970  MRN: [id number]  Referring provider: Rachel Trujillo PT  Dx:   Encounter Diagnosis     ICD-10-CM    1. Vertigo  R42       2. Dizziness  R42       3. Benign paroxysmal positional vertigo of right ear  H81.11           Start Time: 0730  Assessment  Assessment details: Nilam Shukla is a 54 y.o. female presenting to outpatient physical therapy at [facility] with complaints of vertigo. She also reports recently diagnosed with shingles and has R sided ear/jaw sensitivities as well as tightness.   She presents with decreased range of motion, decreased strength, limited flexibility, poor postural awareness, poor body mechanics, altered gait pattern, poor balance, decreased tolerance to activity and decreased functional mobility due to Vertigo  (primary encounter diagnosis), Benign paroxysmal positional vertigo of right ear. Therapist discussed diagnosis, prognosis, plan of care, proper responses to exercises, and HEP. Therapist discussed activities to gradually improve her sensitivities with graded materials as well as pressure. She was instructed to contact therapist on her responses to today's session.  She was also instructed to follow up with MD for her visual changes with blurriness. She would benefit from skilled PT services in order to address these deficits and reach maximum level of function.  Thank you for the referral!  Impairments: abnormal coordination, abnormal muscle firing, abnormal muscle tone, abnormal or restricted ROM, abnormal movement, activity intolerance, impaired balance, impaired physical strength, lacks appropriate home exercise program, pain with function, scapular dyskinesis, poor posture  and poor body mechanics    Symptom irritability: moderateUnderstanding of Dx/Px/POC: good   Prognosis: good    Goals  STGs (in 4 weeks):  1. Pt will report having at least a 50% improvement since I.E.   2. Pt will report  having no sxs with bed mobility.   3. Pt will be negative with smooth pursuit.   4. Pt will be under 12 cm for convergence.   5. Pt will be negative for [place]-Hallpike test.     LTGs (in 12 weeks):  1. Pt will report having at least a 75% improvement since I.E.  2. Pt will be independent with HEP.   3. Pt will deny having increased sxs with turning, tilting, and rotating neck during various positions such as sitting, standing, and/or walking.     Plan  Patient would benefit from: skilled physical therapy  Planned modality interventions: TENS and unattended electrical stimulation  Planned therapy interventions: joint mobilization, manual therapy, patient education, self care, strengthening, stretching, therapeutic activities, therapeutic exercise, home exercise program, gait training, flexibility, balance and canalith repositioning  Frequency: 2x week  Plan of Care beginning date: 10/9/2024  Plan of Care expiration date: 2024  Treatment plan discussed with: patient        Subjective Evaluation    History of Present Illness  Mechanism of injury: Pt is a 54 year old female who presents with recent exacerbation of dizziness and spinning that began a few weeks ago when she was lying on her back star gazing. She reports that she noticed the stars were moving. She has not had intense sxs however she wants to get checked prior to her upcoming travels.  She also reports recently being diagnosed with shingles on the right side of her face which she was on a course of medications and then also contracted a UTI. She reports having jaw tightness/pain as well as R sided facial sensitivities.     Quality of life: good    Pain  Current pain ratin  At best pain ratin  At worst pain ratin  Quality: R jaw/head tenderness, spinning  Relieving factors: relaxation and rest  Aggravating factors: overhead activity (turning/tilting/rotating head, computer work, looking up, bed mobility), bending over  Progression:  same    Patient Goals  Patient goals for therapy: decreased pain, improved balance, increased motion, increased strength, independence with ADLs/IADLs and return to sport/leisure activities          Objective    Observation/Posture: FHP, rounded shoulders    Cervical Screen  (*=pain)  (**=symptom provocation)    AROM: (deg)  Flexion: 50  Extension: 38  R Rot: 55  L Rot: 60  R Lat Flex: 28  L Lat Flex: 12    Palpation/Tenderness: some TPR B UT, LS (R worse than L), R masseter pain/temporalis    Joint Mobility:  Decreased PA mob cervical and thoracic    Strength: (MMT)    R  L  S' Flex  4/5  4/5  S' ABD  4/5  4/5  S' ER  4/5  4/5  S' IR  4/5  4/5    UT  NT  NT  MT  NT  NT  LT  NT  NT    Visual-Occular and Vestibular Testing:  Smooth pursuits: positive (R eye delay)  Convergence: positive (approx 15 cm nose to point)  Saccades: positive (R eye delay)  VORx1: negative  [place]-Hallpike: (R) positive (L) positive (R worse than L)  Roll test: negative      Special Tests  Vertebral Artery: negative         Precautions: standard    HEP: postural awareness - chin tucks, jaw depression, desensitizing techniques, scap retraction, upper trap stretch, levator scap stretch    Specialty Daily Treatment Diary       Manual 10/9       SOR        TPR/STM B UT, LS, cervical paraspinals        B UT stretch        B LS stretch                PA glides of cervical paraspinals        OP Chin retraction                        Exercise Diary         UBE retro                Eply (R): x 3               Hybrid Roll                Supine chin tucks        Supine Hugs to T for pec stretch                Sidelying Thoracic Rotation stretch                Seated chin tucks HEP       Chin tucks to Extension        SNAGs Extension        SNAGs Rotation                Seated Thoracic Extension over half foam                B UT stretch c chin tuck HEP       B LS stretch  HEP               TB Scap Retraction No TB: HEP       TB B S' Ext                TB  B ER c chin tucks        TB B Horiz ABD c chin tuck                Doorway pec stretch shoulder abducted around 60 drgs                        VOR 1 sitting (horiz, vert, diagonally) -> standing        Cancellation sitting (horiz, vert, diagonally) -> standing        VOR 2 (horiz, vert, diagonally) -> standing                VOR 1 dynamic                Hallwalks (horiz, vert, diagonally)         Walking ball toss                Smooth pursuit for horiz, vert, diagonal (uni, bilateral)                Convergence                Saccades for horiz, vert, diagonal (uni, bilateral)                Near/far f/b moving target                                        HEP/EDU Diagnosis, prognosis, POC, proper responses to exercises, HEP, discussed modalities       Modalities        MH                           Skin checks performed pre and post application: intact

## 2024-10-10 ENCOUNTER — OFFICE VISIT (OUTPATIENT)
Dept: FAMILY MEDICINE CLINIC | Facility: CLINIC | Age: 54
End: 2024-10-10
Payer: COMMERCIAL

## 2024-10-10 VITALS
DIASTOLIC BLOOD PRESSURE: 66 MMHG | OXYGEN SATURATION: 98 % | SYSTOLIC BLOOD PRESSURE: 124 MMHG | HEIGHT: 64 IN | WEIGHT: 162 LBS | BODY MASS INDEX: 27.66 KG/M2 | TEMPERATURE: 97 F | HEART RATE: 65 BPM

## 2024-10-10 DIAGNOSIS — R39.9 UTI SYMPTOMS: Primary | ICD-10-CM

## 2024-10-10 LAB
ALBUMIN SERPL-MCNC: 4.6 G/DL (ref 3.8–4.9)
ALP SERPL-CCNC: 62 IU/L (ref 44–121)
ALT SERPL-CCNC: 18 IU/L (ref 0–32)
AST SERPL-CCNC: 17 IU/L (ref 0–40)
BASOPHILS # BLD AUTO: 0.1 X10E3/UL (ref 0–0.2)
BASOPHILS NFR BLD AUTO: 1 %
BILIRUB SERPL-MCNC: 0.5 MG/DL (ref 0–1.2)
BUN SERPL-MCNC: 15 MG/DL (ref 6–24)
BUN/CREAT SERPL: 16 (ref 9–23)
CALCIUM SERPL-MCNC: 9.8 MG/DL (ref 8.7–10.2)
CHLORIDE SERPL-SCNC: 97 MMOL/L (ref 96–106)
CO2 SERPL-SCNC: 22 MMOL/L (ref 20–29)
CREAT SERPL-MCNC: 0.95 MG/DL (ref 0.57–1)
EGFR: 71 ML/MIN/1.73
EOSINOPHIL # BLD AUTO: 0.2 X10E3/UL (ref 0–0.4)
EOSINOPHIL NFR BLD AUTO: 2 %
ERYTHROCYTE [DISTWIDTH] IN BLOOD BY AUTOMATED COUNT: 13.8 % (ref 11.7–15.4)
GLOBULIN SER-MCNC: 2.3 G/DL (ref 1.5–4.5)
GLUCOSE SERPL-MCNC: 78 MG/DL (ref 70–99)
HCT VFR BLD AUTO: 43.3 % (ref 34–46.6)
HGB BLD-MCNC: 14.4 G/DL (ref 11.1–15.9)
IMM GRANULOCYTES # BLD: 0.1 X10E3/UL (ref 0–0.1)
IMM GRANULOCYTES NFR BLD: 1 %
LYMPHOCYTES # BLD AUTO: 4.3 X10E3/UL (ref 0.7–3.1)
LYMPHOCYTES NFR BLD AUTO: 39 %
MCH RBC QN AUTO: 29.6 PG (ref 26.6–33)
MCHC RBC AUTO-ENTMCNC: 33.3 G/DL (ref 31.5–35.7)
MCV RBC AUTO: 89 FL (ref 79–97)
MONOCYTES # BLD AUTO: 0.7 X10E3/UL (ref 0.1–0.9)
MONOCYTES NFR BLD AUTO: 7 %
NEUTROPHILS # BLD AUTO: 5.7 X10E3/UL (ref 1.4–7)
NEUTROPHILS NFR BLD AUTO: 50 %
PLATELET # BLD AUTO: 295 X10E3/UL (ref 150–450)
POTASSIUM SERPL-SCNC: 3.9 MMOL/L (ref 3.5–5.2)
PROT SERPL-MCNC: 6.9 G/DL (ref 6–8.5)
RBC # BLD AUTO: 4.86 X10E6/UL (ref 3.77–5.28)
SL AMB  POCT GLUCOSE, UA: NEGATIVE
SL AMB LEUKOCYTE ESTERASE,UA: NEGATIVE
SL AMB POCT BILIRUBIN,UA: NORMAL
SL AMB POCT BLOOD,UA: NEGATIVE
SL AMB POCT CLARITY,UA: CLEAR
SL AMB POCT COLOR,UA: YELLOW
SL AMB POCT KETONES,UA: NEGATIVE
SL AMB POCT NITRITE,UA: NEGATIVE
SL AMB POCT PH,UA: 6
SL AMB POCT SPECIFIC GRAVITY,UA: 1.01
SL AMB POCT URINE PROTEIN: NEGATIVE
SL AMB POCT UROBILINOGEN: 0.2
SODIUM SERPL-SCNC: 138 MMOL/L (ref 134–144)
WBC # BLD AUTO: 11 X10E3/UL (ref 3.4–10.8)

## 2024-10-10 PROCEDURE — 81002 URINALYSIS NONAUTO W/O SCOPE: CPT | Performed by: NURSE PRACTITIONER

## 2024-10-10 PROCEDURE — 99213 OFFICE O/P EST LOW 20 MIN: CPT | Performed by: NURSE PRACTITIONER

## 2024-10-10 RX ORDER — CEFUROXIME AXETIL 500 MG/1
500 TABLET ORAL EVERY 12 HOURS SCHEDULED
Qty: 14 TABLET | Refills: 0 | Status: SHIPPED | OUTPATIENT
Start: 2024-10-10 | End: 2024-10-17

## 2024-10-10 NOTE — PROGRESS NOTES
"Ambulatory Visit  Name: Nilam Shukla      : 1970      MRN: [id number]  Encounter Provider: YAZMIN Chawla  Encounter Date: 10/10/2024   Encounter department: [facility]    Assessment & Plan  UTI symptoms  Urine collected today will send for culture  Start ceftin twice daily  Stop macrobid  Adequate fluids  If symptoms worsen will need to go to ER for possible pyleonephritis  Orders:    POCT urine dip    Urine culture    cefuroxime (CEFTIN) 500 mg tablet; Take 1 tablet (500 mg total) by mouth every 12 (twelve) hours for 7 days       History of Present Illness     Here today for follow up on uti  Just feels weakness and shakinesshas been there all long    No back pain except small twinge in the car ride over today    Last semaglutide injection was    Shingles pain is still present- gabapentin helps knock it down.           Review of Systems   Constitutional:  Positive for activity change and fatigue. Negative for chills and fever.   HENT: Negative.     Eyes: Negative.    Respiratory: Negative.     Cardiovascular: Negative.    Skin:  Positive for rash (shingles rash resolving on right face and ear).   Neurological:  Positive for weakness (shaky).   Hematological:  Does not bruise/bleed easily.           Objective     /66 (BP Location: Left arm, Patient Position: Sitting, Cuff Size: Standard)   Pulse 65   Temp (!) 97 °F (36.1 °C) (Tympanic)   Ht 5' 4\" (1.626 m)   Wt 73.5 kg (162 lb)   SpO2 98%   BMI 27.81 kg/m²     Physical Exam  Vitals reviewed.   Constitutional:       General: She is not in acute distress.     Appearance: Normal appearance. She is not ill-appearing or toxic-appearing.   HENT:      Head: Normocephalic and atraumatic.      Right Ear: Tympanic membrane, ear canal and external ear normal.      Left Ear: Tympanic membrane, ear canal and external ear normal.      Nose: Nose normal.      Mouth/Throat:      Mouth: Mucous membranes are moist.      Pharynx: " Oropharynx is clear.   Eyes:      Conjunctiva/sclera: Conjunctivae normal.      Pupils: Pupils are equal, round, and reactive to light.   Cardiovascular:      Rate and Rhythm: Normal rate and regular rhythm.      Heart sounds: Normal heart sounds.   Pulmonary:      Effort: Pulmonary effort is normal. No respiratory distress.      Breath sounds: Normal breath sounds.   Abdominal:      Palpations: Abdomen is soft.      Tenderness: There is no abdominal tenderness. There is no right CVA tenderness or left CVA tenderness.   Musculoskeletal:         General: Normal range of motion.   Skin:     General: Skin is warm and dry.      Findings: Rash (resolving rash on right face) present.   Neurological:      General: No focal deficit present.      Mental Status: She is alert and oriented to person, place, and time.   Psychiatric:         Mood and Affect: Mood normal.         Behavior: Behavior normal.

## 2024-10-12 LAB
BACTERIA UR CULT: NORMAL
Lab: NO GROWTH

## 2024-10-15 ENCOUNTER — OFFICE VISIT (OUTPATIENT)
Dept: PHYSICAL THERAPY | Facility: CLINIC | Age: 54
End: 2024-10-15
Payer: COMMERCIAL

## 2024-10-15 DIAGNOSIS — R42 DIZZINESS: ICD-10-CM

## 2024-10-15 DIAGNOSIS — R42 VERTIGO: Primary | ICD-10-CM

## 2024-10-15 DIAGNOSIS — H81.11 BENIGN PAROXYSMAL POSITIONAL VERTIGO OF RIGHT EAR: ICD-10-CM

## 2024-10-15 PROCEDURE — 97110 THERAPEUTIC EXERCISES: CPT | Performed by: PHYSICAL THERAPIST

## 2024-10-15 PROCEDURE — 97140 MANUAL THERAPY 1/> REGIONS: CPT | Performed by: PHYSICAL THERAPIST

## 2024-10-15 NOTE — PROGRESS NOTES
Daily Note     Today's date: 10/15/2024  Patient name: Nilam Shukla  : 1970  MRN: [id number]  Referring provider: Rachel Trujillo, PT  Dx:   Encounter Diagnosis     ICD-10-CM    1. Vertigo  R42       2. Benign paroxysmal positional vertigo of right ear  H81.11       3. Dizziness  R42                      Subjective: She reports she feels better with being on the antibiotic however she is off balanced.       Objective: See treatment diary below      Assessment: Tolerated treatment well; initiated POC with Epley maneuver which improved with rounds. MT performed after receiving consent from pt; she has increased tenderness with palpation of B UT and B LS which improves post STM. Discussed proper responses to session. Instructed pt to contact therapist with any questions and/or concerns.   Patient demonstrated fatigue post treatment and would benefit from continued PT      Plan: Continue per plan of care.      Precautions: standard    HEP: postural awareness - chin tucks, jaw depression, desensitizing techniques, scap retraction, upper trap stretch, levator scap stretch    Specialty Daily Treatment Diary       Manual 10/9 10/15      SOR  SMF      TPR/STM B UT, LS, cervical paraspinals  SMF ea      B UT stretch  SMF ea      B LS stretch  SMF ea              PA glides of cervical paraspinals        OP Chin retraction                        Exercise Diary         UBE retro                Eply (R): x 3 (R) x 4  (L) negative              Hybrid Roll                Supine chin tucks  5 sec x 10      Supine Hugs to T for pec stretch                Sidelying Thoracic Rotation stretch                Seated chin tucks HEP       Chin tucks to Extension        SNAGs Extension        SNAGs Rotation                Seated Thoracic Extension over half foam                B UT stretch c chin tuck HEP       B LS stretch  HEP               TB Scap Retraction No TB: HEP       TB B S' Ext                TB B ER c chin tucks        TB  B Horiz ABD c chin tuck                Doorway pec stretch shoulder abducted around 60 drgs                        VOR 1 sitting (horiz, vert, diagonally) -> standing        Cancellation sitting (horiz, vert, diagonally) -> standing        VOR 2 (horiz, vert, diagonally) -> standing                VOR 1 dynamic                Hallwalks (horiz, vert, diagonally)         Walking ball toss                Smooth pursuit for horiz, vert, diagonal (uni, bilateral)                Convergence                Saccades for horiz, vert, diagonal (uni, bilateral)                Near/far f/b moving target                                        HEP/EDU Diagnosis, prognosis, POC, proper responses to exercises, HEP, discussed modalities       Modalities        MH  10 mins post with wedge for elevation                         Skin checks performed pre and post application: intact

## 2024-12-17 ENCOUNTER — OFFICE VISIT (OUTPATIENT)
Dept: FAMILY MEDICINE CLINIC | Facility: CLINIC | Age: 54
End: 2024-12-17
Payer: COMMERCIAL

## 2024-12-17 VITALS
WEIGHT: 162.12 LBS | OXYGEN SATURATION: 98 % | DIASTOLIC BLOOD PRESSURE: 64 MMHG | SYSTOLIC BLOOD PRESSURE: 112 MMHG | HEIGHT: 64 IN | HEART RATE: 68 BPM | TEMPERATURE: 97.2 F | BODY MASS INDEX: 27.68 KG/M2

## 2024-12-17 DIAGNOSIS — E78.49 OTHER HYPERLIPIDEMIA: ICD-10-CM

## 2024-12-17 DIAGNOSIS — E66.811 CLASS 1 OBESITY DUE TO EXCESS CALORIES WITHOUT SERIOUS COMORBIDITY WITH BODY MASS INDEX (BMI) OF 31.0 TO 31.9 IN ADULT: Primary | ICD-10-CM

## 2024-12-17 DIAGNOSIS — E66.09 CLASS 1 OBESITY DUE TO EXCESS CALORIES WITHOUT SERIOUS COMORBIDITY WITH BODY MASS INDEX (BMI) OF 31.0 TO 31.9 IN ADULT: Primary | ICD-10-CM

## 2024-12-17 PROCEDURE — 99213 OFFICE O/P EST LOW 20 MIN: CPT | Performed by: NURSE PRACTITIONER

## 2024-12-17 NOTE — PROGRESS NOTES
"Name: Nilam Shukla      : 1970      MRN: [id number]  Encounter Provider: YAZMIN Chawla  Encounter Date: 2024   Encounter department: [facility]  :  Assessment & Plan  Class 1 obesity due to excess calories without serious comorbidity with body mass index (BMI) of 31.0 to 31.9 in adult  Uses compounded semaglutide  Weight has stabilized but has increased cravings with lower dose  Still only at 0.35ml weekly  Encouraged to titrate up get back into exercise routine         Other hyperlipidemia  Elevated 2024  Recommend mediterranean diet   Continue with exercise              History of Present Illness       Here today for 3 month medication check  She finished her hiking [place] - amazing experience.  She wants to do [place] in 1 day next fall- she is     Weight has remained stable since October  She is only using semaglutide compound she is .35ml   Cravings have increased since October and her hike  She is less active as she is not training for the hike anymore        Review of Systems   Constitutional:  Negative for chills and fever.   HENT: Negative.  Negative for ear pain and sore throat.    Eyes:  Negative for pain and visual disturbance.   Respiratory:  Negative for cough and shortness of breath.    Cardiovascular:  Negative for chest pain and palpitations.   Gastrointestinal:  Negative for abdominal pain, constipation, diarrhea, nausea and vomiting.   Endocrine: Negative.    Genitourinary:  Negative for dysuria and hematuria.   Musculoskeletal:  Negative for arthralgias and back pain.   Skin:  Negative for color change and rash.   Neurological:  Negative for dizziness, seizures, syncope and headaches.   Psychiatric/Behavioral:  Negative for sleep disturbance.    All other systems reviewed and are negative.      Objective   /64   Pulse 68   Temp (!) 97.2 °F (36.2 °C) (Tympanic)   Ht 5' 4\" (1.626 m)   Wt 73.5 kg (162 lb 1.9 oz)   SpO2 98%   BMI " 27.83 kg/m²      Physical Exam  Vitals and nursing note reviewed.   Constitutional:       General: She is not in acute distress.     Appearance: Normal appearance. She is well-developed.   HENT:      Head: Normocephalic and atraumatic.      Right Ear: External ear normal.      Left Ear: External ear normal.   Eyes:      General: No scleral icterus.     Conjunctiva/sclera: Conjunctivae normal.   Neck:      Thyroid: No thyromegaly or thyroid tenderness.   Cardiovascular:      Rate and Rhythm: Normal rate and regular rhythm.      Pulses:           Radial pulses are 2+ on the right side and 2+ on the left side.      Heart sounds: No murmur heard.  Pulmonary:      Effort: Pulmonary effort is normal. No respiratory distress.      Breath sounds: Normal breath sounds.   Musculoskeletal:      Cervical back: Neck supple.      Right lower leg: No edema.      Left lower leg: No edema.   Skin:     General: Skin is warm and dry.   Neurological:      Mental Status: She is alert and oriented to person, place, and time.

## 2024-12-17 NOTE — ASSESSMENT & PLAN NOTE
Uses compounded semaglutide  Weight has stabilized but has increased cravings with lower dose  Still only at 0.35ml weekly  Encouraged to titrate up get back into exercise routine

## 2025-01-27 ENCOUNTER — OFFICE VISIT (OUTPATIENT)
Dept: URGENT CARE | Facility: CLINIC | Age: 55
End: 2025-01-27
Payer: COMMERCIAL

## 2025-01-27 VITALS
HEIGHT: 64 IN | BODY MASS INDEX: 28.17 KG/M2 | TEMPERATURE: 100.1 F | SYSTOLIC BLOOD PRESSURE: 120 MMHG | OXYGEN SATURATION: 99 % | WEIGHT: 165 LBS | RESPIRATION RATE: 20 BRPM | DIASTOLIC BLOOD PRESSURE: 78 MMHG | HEART RATE: 94 BPM

## 2025-01-27 DIAGNOSIS — J11.1 FLU: Primary | ICD-10-CM

## 2025-01-27 PROCEDURE — 99213 OFFICE O/P EST LOW 20 MIN: CPT

## 2025-01-27 PROCEDURE — 87636 SARSCOV2 & INF A&B AMP PRB: CPT

## 2025-01-27 RX ORDER — OSELTAMIVIR PHOSPHATE 75 MG/1
75 CAPSULE ORAL EVERY 12 HOURS SCHEDULED
Qty: 10 CAPSULE | Refills: 0 | Status: SHIPPED | OUTPATIENT
Start: 2025-01-27 | End: 2025-02-01

## 2025-01-28 LAB
FLUAV RNA RESP QL NAA+PROBE: NEGATIVE
FLUBV RNA RESP QL NAA+PROBE: NEGATIVE
SARS-COV-2 RNA RESP QL NAA+PROBE: NEGATIVE

## 2025-01-28 NOTE — PATIENT INSTRUCTIONS
Increase your fluids and rest  Fluids should include Gatorade water ginger ale  Tylenol or Motrin for fever or discomfort    COVID flu test will take 24 hours to come back  You can start the Tamiflu when you pick it up tonight  Watch your [employer] mihir if your flu test comes back negative stop taking the Tamiflu

## 2025-01-28 NOTE — PROGRESS NOTES
[facility]        NAME: Nilam Shukla is a 54 y.o. female  : 1970    MRN: [id number]  DATE: 2025  TIME: 7:50 PM    Assessment and Plan   Flu [J11.1]  1. Flu  oseltamivir (TAMIFLU) 75 mg capsule    Covid/Flu- Office Collect Normal            Patient Instructions   Increase your fluids and rest  Fluids should include Gatorade water ginger ale  Tylenol or Motrin for fever or discomfort    COVID flu test will take 24 hours to come back  You can start the Tamiflu when you pick it up tonight  Watch your [employer] mihir if your flu test comes back negative stop taking the Tamiflu    Follow up with PCP in 3-5 days.  Proceed to  ER if symptoms worsen.    If tests have been performed at [facility], our office will contact you with results if changes need to be made to the care plan discussed with you at the visit.  You [facility]    Chief Complaint   No chief complaint on file.        History of Present Illness       Patient states she woke today with not feeling well.  She works from home and had to lay down.  She started with chills this afternoon.  No fever at that time.  She states she has nausea, headache, and feels lightheaded.   Patient denies any chest pain or SOB.  Patient denies leaving the house in the last six days.  There are two other people living with her and they are not sick.  Patient states she thinks she may have a FB in her L 2nd toe.  She states her  was digging at it.  She declined an xray.  Patient denies any injury to the toe.  She wears slippers at home.        Review of Systems   Review of Systems   Constitutional:  Positive for chills, fatigue and fever. Negative for appetite change.   HENT:  Negative for congestion, postnasal drip and sore throat.    Respiratory: Negative.  Negative for cough and shortness of breath.    Gastrointestinal:  Positive for nausea.   Genitourinary: Negative.    Musculoskeletal:  Positive for  "myalgias.   Neurological:  Positive for light-headedness and headaches.         Current Medications       Current Outpatient Medications:     famotidine (PEPCID) 20 mg tablet, Take 20 mg by mouth 2 (two) times a day, Disp: , Rfl:     oseltamivir (TAMIFLU) 75 mg capsule, Take 1 capsule (75 mg total) by mouth every 12 (twelve) hours for 5 days, Disp: 10 capsule, Rfl: 0    SEMAGLUTIDE, 1 MG/DOSE, SC, , Disp: , Rfl:     Syringe/Needle, Disp, (Syringe Luer Slip) 27G X 1/2\" 1 ML MISC, Use once a week For compounded semaglutide injection weekly, Disp: 50 each, Rfl: 0    acetaZOLAMIDE (DIAMOX) 125 mg tablet, Take 1 tablet (125 mg total) by mouth 2 (two) times a day (Patient not taking: Reported on 1/27/2025), Disp: 30 tablet, Rfl: 0    gabapentin (Neurontin) 100 mg capsule, Take 2 capsules (200 mg total) by mouth 2 (two) times a day AND 3 capsules (300 mg total) daily at bedtime. For nerve pain., Disp: 210 capsule, Rfl: 0    valACYclovir (VALTREX) 1,000 mg tablet, Take 1 tablet (1,000 mg total) by mouth 3 (three) times a day for 7 days, Disp: 21 tablet, Rfl: 0    Current Allergies     Allergies as of 01/27/2025 - Reviewed 01/27/2025   Allergen Reaction Noted    Clindamycin Rash 11/08/2013    Doxycycline GI Intolerance 11/08/2013            The following portions of the patient's history were reviewed and updated as appropriate: allergies, current medications, past family history, past medical history, past social history, past surgical history and problem list.     Past Medical History:   Diagnosis Date    BRCA1 negative     BRCA2 negative     Bunion     Outside of foot    Dizziness     Ear problems     Migraine     Nasal congestion     Tinnitus        Past Surgical History:   Procedure Laterality Date    ENDOMETRIAL ABLATION  2001    LAPAROSCOPIC OVARIAN CYSTECTOMY Left 2001    LIPOMA RESECTION Left 2003    OOPHORECTOMY Bilateral 2019    TUBAL LIGATION  2019    UMBILICAL HERNIA REPAIR  2003    WISDOM TOOTH EXTRACTION  1988 " "      Family History   Problem Relation Age of Onset    Ovarian cancer Mother 65    Glaucoma Mother     Cancer Mother         Ovarian, lung    Skin cancer Mother         80's-multiple skin ca    Lung cancer Mother         Late 80's    Arthritis Mother     Coronary artery disease Father     Diabetes Father     No Known Problems Daughter     No Known Problems Maternal Grandmother     Prostate cancer Maternal Grandfather     No Known Problems Paternal Grandmother     No Known Problems Paternal Grandfather     Diabetes Brother     Diabetes Brother     Cirrhosis Brother     Alcohol abuse Brother     No Known Problems Maternal Aunt     No Known Problems Maternal Aunt     No Known Problems Paternal Aunt     No Known Problems Paternal Aunt     No Known Problems Paternal Aunt     Colon cancer Neg Hx     Rectal cancer Neg Hx     Colon polyps Neg Hx          Medications have been verified.        Objective   /78   Pulse 94   Temp 100.1 °F (37.8 °C)   Resp 20   Ht 5' 4\" (1.626 m)   Wt 74.8 kg (165 lb)   SpO2 99%   BMI 28.32 kg/m²   No LMP recorded. (Menstrual status: Oopherectomy).       Physical Exam     Physical Exam  Constitutional:       Appearance: Normal appearance.   HENT:      Head: Normocephalic and atraumatic.      Right Ear: Tympanic membrane, ear canal and external ear normal.      Left Ear: Tympanic membrane, ear canal and external ear normal.      Nose: Nose normal.      Mouth/Throat:      Mouth: Mucous membranes are moist.      Pharynx: Oropharynx is clear. No posterior oropharyngeal erythema.   Eyes:      Conjunctiva/sclera: Conjunctivae normal.      Pupils: Pupils are equal, round, and reactive to light.   Cardiovascular:      Rate and Rhythm: Normal rate and regular rhythm.      Pulses: Normal pulses.      Heart sounds: Normal heart sounds. No murmur heard.     No friction rub. No gallop.   Pulmonary:      Effort: Pulmonary effort is normal.      Breath sounds: Normal breath sounds. No wheezing, " rhonchi or rales.   Abdominal:      General: Abdomen is flat. Bowel sounds are normal.   Musculoskeletal:         General: Normal range of motion.   Skin:     General: Skin is warm and dry.      Capillary Refill: Capillary refill takes less than 2 seconds.   Neurological:      General: No focal deficit present.      Mental Status: She is alert and oriented to person, place, and time.   Psychiatric:         Mood and Affect: Mood normal.         Thought Content: Thought content normal.         Judgment: Judgment normal.

## 2025-01-30 ENCOUNTER — OFFICE VISIT (OUTPATIENT)
Dept: FAMILY MEDICINE CLINIC | Facility: CLINIC | Age: 55
End: 2025-01-30
Payer: COMMERCIAL

## 2025-01-30 VITALS
BODY MASS INDEX: 27.66 KG/M2 | HEIGHT: 64 IN | DIASTOLIC BLOOD PRESSURE: 64 MMHG | WEIGHT: 162 LBS | HEART RATE: 81 BPM | SYSTOLIC BLOOD PRESSURE: 108 MMHG | OXYGEN SATURATION: 98 % | TEMPERATURE: 99.6 F

## 2025-01-30 DIAGNOSIS — R50.9 FEVER, UNSPECIFIED FEVER CAUSE: Primary | ICD-10-CM
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
## 2025-01-30 PROCEDURE — 99213 OFFICE O/P EST LOW 20 MIN: CPT | Performed by: NURSE PRACTITIONER

## 2025-01-30 NOTE — PROGRESS NOTES
"Name: Nilam Shukla      : 1970      MRN: [id number]  Encounter Provider: YAZMIN Chawla  Encounter Date: 2025   Encounter department: [facility]  :  Assessment & Plan  Fever, unspecified fever cause  Check labs   Suspect viral  Symptom management  Increase fluids  Tylenol and or advil as needed for fevers  Orders:    CBC and differential; Future    Comprehensive metabolic panel; Future    Lyme Total AB W Reflex to IGM/IGG; Future           History of Present Illness   Still there and not feeling better- Started  night not feeling great. Woke up Monday morning and felt worse and got chills and fatigue and weakness and lightheadness - not vertigo, felt like she would faint. And headache and nausea. Feels like a stabbing pain. They have felt like sharp waves - when she is standing or moving not hitting her when she is lying down or sitting down. The lightheadedness hasn't gotten worse but hasn't gotten better    Not sure if ibuprofen is helping or not  Has been doing herbal teas, gingerale  Appetite is normal    She went to urgent care Monday- flu and covid were negative, she never started tamiflu      Review of Systems   Constitutional:  Positive for activity change, appetite change, chills, fatigue and fever.   HENT: Negative.     Respiratory: Negative.     Cardiovascular: Negative.    Gastrointestinal:  Positive for nausea. Negative for abdominal pain, constipation, diarrhea and vomiting.   Musculoskeletal:  Positive for myalgias.   Skin:  Negative for rash.   Neurological:  Positive for weakness, light-headedness and headaches. Negative for dizziness.       Objective   /62   Pulse 81   Temp 99.6 °F (37.6 °C) (Tympanic)   Ht 5' 4\" (1.626 m)   Wt 73.5 kg (162 lb)   SpO2 98%   BMI 27.81 kg/m²      Physical Exam  Vitals reviewed.   Constitutional:       Appearance: Normal appearance. She is ill-appearing.   HENT:      Head: Normocephalic.      Right Ear: " Tympanic membrane, ear canal and external ear normal.      Left Ear: Tympanic membrane, ear canal and external ear normal.      Nose: Nose normal.      Mouth/Throat:      Mouth: Mucous membranes are moist.      Pharynx: Oropharynx is clear.   Eyes:      Conjunctiva/sclera: Conjunctivae normal.      Pupils: Pupils are equal, round, and reactive to light.   Cardiovascular:      Rate and Rhythm: Normal rate and regular rhythm.      Heart sounds: Normal heart sounds.   Pulmonary:      Effort: No respiratory distress.      Breath sounds: Normal breath sounds.   Abdominal:      General: Bowel sounds are normal.      Palpations: Abdomen is soft.      Tenderness: There is no abdominal tenderness.   Skin:     Findings: No rash.   Neurological:      Mental Status: She is alert and oriented to person, place, and time.   Psychiatric:         Mood and Affect: Mood normal.         Behavior: Behavior normal.

## 2025-01-31 DIAGNOSIS — R50.9 FEVER, UNSPECIFIED FEVER CAUSE: Primary | ICD-10-CM

## 2025-01-31 RX ORDER — AMOXICILLIN 500 MG/1
500 TABLET, FILM COATED ORAL 3 TIMES DAILY
Qty: 42 TABLET | Refills: 0 | Status: SHIPPED | OUTPATIENT
Start: 2025-01-31 | End: 2025-02-06 | Stop reason: SDUPTHER

## 2025-02-01 LAB
ALBUMIN SERPL-MCNC: 4.2 G/DL (ref 3.8–4.9)
ALP SERPL-CCNC: 63 IU/L (ref 44–121)
ALT SERPL-CCNC: 12 IU/L (ref 0–32)
AST SERPL-CCNC: 15 IU/L (ref 0–40)
B BURGDOR AB SER IA.MTTT-IMP: ABNORMAL
B BURGDOR IGG SERPL QL IA: POSITIVE
B BURGDOR IGG+IGM SER QL IA: POSITIVE
B BURGDOR IGM SERPL QL IA: NEGATIVE
BASOPHILS # BLD AUTO: 0.1 X10E3/UL (ref 0–0.2)
BASOPHILS NFR BLD AUTO: 1 %
BILIRUB SERPL-MCNC: 0.5 MG/DL (ref 0–1.2)
BUN SERPL-MCNC: 16 MG/DL (ref 6–24)
BUN/CREAT SERPL: 18 (ref 9–23)
CALCIUM SERPL-MCNC: 9.2 MG/DL (ref 8.7–10.2)
CHLORIDE SERPL-SCNC: 103 MMOL/L (ref 96–106)
CO2 SERPL-SCNC: 23 MMOL/L (ref 20–29)
CREAT SERPL-MCNC: 0.9 MG/DL (ref 0.57–1)
EGFR: 76 ML/MIN/1.73
EOSINOPHIL # BLD AUTO: 0.2 X10E3/UL (ref 0–0.4)
EOSINOPHIL NFR BLD AUTO: 3 %
ERYTHROCYTE [DISTWIDTH] IN BLOOD BY AUTOMATED COUNT: 12.1 % (ref 11.7–15.4)
GLOBULIN SER-MCNC: 2.1 G/DL (ref 1.5–4.5)
GLUCOSE SERPL-MCNC: 109 MG/DL (ref 70–99)
HCT VFR BLD AUTO: 38.2 % (ref 34–46.6)
HGB BLD-MCNC: 13 G/DL (ref 11.1–15.9)
IMM GRANULOCYTES # BLD: 0 X10E3/UL (ref 0–0.1)
IMM GRANULOCYTES NFR BLD: 0 %
LYMPHOCYTES # BLD AUTO: 2.3 X10E3/UL (ref 0.7–3.1)
LYMPHOCYTES NFR BLD AUTO: 38 %
MCH RBC QN AUTO: 30.3 PG (ref 26.6–33)
MCHC RBC AUTO-ENTMCNC: 34 G/DL (ref 31.5–35.7)
MCV RBC AUTO: 89 FL (ref 79–97)
MONOCYTES # BLD AUTO: 0.5 X10E3/UL (ref 0.1–0.9)
MONOCYTES NFR BLD AUTO: 9 %
NEUTROPHILS # BLD AUTO: 3 X10E3/UL (ref 1.4–7)
NEUTROPHILS NFR BLD AUTO: 49 %
PLATELET # BLD AUTO: 220 X10E3/UL (ref 150–450)
POTASSIUM SERPL-SCNC: 4.2 MMOL/L (ref 3.5–5.2)
PROT SERPL-MCNC: 6.3 G/DL (ref 6–8.5)
RBC # BLD AUTO: 4.29 X10E6/UL (ref 3.77–5.28)
SODIUM SERPL-SCNC: 139 MMOL/L (ref 134–144)
WBC # BLD AUTO: 6.1 X10E3/UL (ref 3.4–10.8)

## 2025-02-03 ENCOUNTER — RESULTS FOLLOW-UP (OUTPATIENT)
Dept: FAMILY MEDICINE CLINIC | Facility: CLINIC | Age: 55
End: 2025-02-03

## 2025-02-06 ENCOUNTER — TELEPHONE (OUTPATIENT)
Age: 55
End: 2025-02-06
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
## 2025-02-06 DIAGNOSIS — R50.9 FEVER, UNSPECIFIED FEVER CAUSE: ICD-10-CM

## 2025-02-06 RX ORDER — DOXYCYCLINE 100 MG/1
200 TABLET ORAL ONCE
Qty: 2 TABLET | Refills: 0 | Status: SHIPPED | OUTPATIENT
Start: 2025-02-06 | End: 2025-02-06

## 2025-02-06 RX ORDER — AMOXICILLIN 500 MG/1
500 TABLET, FILM COATED ORAL 3 TIMES DAILY
Qty: 42 TABLET | Refills: 0 | Status: SHIPPED | OUTPATIENT
Start: 2025-02-06 | End: 2025-02-20

## 2025-02-06 NOTE — TELEPHONE ENCOUNTER
Patient had called in and stated she had taken the medication     doxycycline (ADOXA) 100 MG tablet     She is experiencing bad nausea, and throwing up since she had took the medication. She was wondering if something different could be prescribed, as she now got rid of the medication and didn't want to take it after what she experienced.     Please advise out to patient once this message was reviewed.

## 2025-02-06 NOTE — TELEPHONE ENCOUNTER
Patient called to F/U on message she sent to Bev on [employer] about the Tick she found. I did explain that Bev is not in till 1 pm. Can someone please get back to the patient about this?    Thank you

## 2025-02-06 NOTE — TELEPHONE ENCOUNTER
Patient was returning phone call, relayed Bev Roth's message and patient understood. Patient stated that they do have the Amoxicillin.     No further action need
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
## 2025-02-07 RX ORDER — DOXYCYCLINE 100 MG/1
200 CAPSULE ORAL ONCE
Qty: 2 CAPSULE | Refills: 0 | Status: SHIPPED | OUTPATIENT
Start: 2025-02-07 | End: 2025-02-07

## 2025-02-12 NOTE — PROGRESS NOTES
Daily Note     Today's date: 2022  Patient name: Lor Sorto  : 1970  MRN: [id number]  Referring provider: Lillie Stephens DPM  Dx:   Encounter Diagnosis     ICD-10-CM    1  Peroneal tendonitis, left  M76 72       2  Left foot pain  M79 672       3  Muscle weakness (generalized)  M62 81                      Subjective: Patient states she is doing well  She denies pain arriving to PT today  Objective: See treatment diary below      Assessment: Tolerated treatment well  Initiated POC on RB for active warmup today  She did have some pain with PROM into DF and PF, diminished with completion  Patient demonstrated fatigue post treatment, exhibited good technique with therapeutic exercises and would benefit from continued PT      Plan: Continue per plan of care        Precautions: standard     HEP: towel crunches    Specialty Daily Treatment Diary       Manual    STM/TPR dorsal and plantar aspect of L foot along peroneals    SMF SMF RA   L ankle distraction RA  SMF SMF RA   Mid foot A/P glides   SMF SMF    L GT distraction RA  SMF SMF RA   L PROM all planes RA  SMF SMF RA   ISATM to Tib ant and calf near Achilles    SMF            Exercise Diary         RB 5 min                HR (B) -> Half foam HR (B)  20x  20     Up on two lower on RLE                Airex Tandem        Airex SLS                Standing Arch Raises  5 sec x 15                       Slant board gastroc-soleus stretch 20 sec x 3  20 sec x 3  20 sec x (gastroc only)  20 sec x 3 (gastroc only) 20 sec x 3                    Seated HS stretch        Toe Crunches 2 min 2 min 2 mins 2 mins 2 mins    Angora Pickup        Toe Yoga (big toe up and romain down; big toe down and romain up) 5 sec x 10  2 sec x 10 ea 2 sec x 10 ea 2 sec x 10    Dowel roll 2 min   2 mins 2 mins 2 mins    Seated PF stretch                Toe Extension stretch                Seated Fitterboard circular (A/P, M/L, circles cw/ccw) -> Detail Level: Detailed Standing L3 20 ea direction  20x ea direction  20 ea direction L3: 20 ea direction L3: 20 ea direction                   TB A' DF/PF OTB 20x    OTB 15x   TB A' Inv/Ev OTB 20x     OTB 15x                   Foam roller calves                        TB Bridges        TB Clamshells                Sidelying H' ABD                Prone quad stretch c SOS        Supine HS stretch                HEP/EDU Diagnosis, prognosis, POC, proper responses to exercises, HEP       Modalities        MH  5 min post  8 mins pre 5 mins pre            US        Laser                                   Skin checks performed pre and post application: intact
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
## 2025-02-28 ENCOUNTER — RESULTS FOLLOW-UP (OUTPATIENT)
Dept: FAMILY MEDICINE CLINIC | Facility: CLINIC | Age: 55
End: 2025-02-28

## 2025-02-28 LAB
B BURGDOR AB SER IA.MTTT-IMP: ABNORMAL
B BURGDOR IGG SERPL QL IA: POSITIVE
B BURGDOR IGG+IGM SER QL IA: POSITIVE
B BURGDOR IGM SERPL QL IA: NEGATIVE
BASOPHILS # BLD AUTO: 0 X10E3/UL (ref 0–0.2)
BASOPHILS NFR BLD AUTO: 1 %
EOSINOPHIL # BLD AUTO: 0.1 X10E3/UL (ref 0–0.4)
EOSINOPHIL NFR BLD AUTO: 1 %
ERYTHROCYTE [DISTWIDTH] IN BLOOD BY AUTOMATED COUNT: 12.8 % (ref 11.7–15.4)
HCT VFR BLD AUTO: 41.2 % (ref 34–46.6)
HGB BLD-MCNC: 13.7 G/DL (ref 11.1–15.9)
IMM GRANULOCYTES # BLD: 0 X10E3/UL (ref 0–0.1)
IMM GRANULOCYTES NFR BLD: 0 %
LYMPHOCYTES # BLD AUTO: 2.3 X10E3/UL (ref 0.7–3.1)
LYMPHOCYTES NFR BLD AUTO: 29 %
MCH RBC QN AUTO: 29.9 PG (ref 26.6–33)
MCHC RBC AUTO-ENTMCNC: 33.3 G/DL (ref 31.5–35.7)
MCV RBC AUTO: 90 FL (ref 79–97)
MONOCYTES # BLD AUTO: 0.7 X10E3/UL (ref 0.1–0.9)
MONOCYTES NFR BLD AUTO: 8 %
NEUTROPHILS # BLD AUTO: 5 X10E3/UL (ref 1.4–7)
NEUTROPHILS NFR BLD AUTO: 61 %
PLATELET # BLD AUTO: 252 X10E3/UL (ref 150–450)
RBC # BLD AUTO: 4.58 X10E6/UL (ref 3.77–5.28)
WBC # BLD AUTO: 8.1 X10E3/UL (ref 3.4–10.8)

## 2025-03-03 NOTE — TELEPHONE ENCOUNTER
Spoke with patient and made her aware of results. Patient states her neck pain is getting worse not better and would like some advice on what to do.

## 2025-03-03 NOTE — TELEPHONE ENCOUNTER
----- Message from YAZMIN Rodriguez sent at 3/3/2025  9:08 AM EST -----  Your labs show old lyme, not current.

## 2025-03-05 ENCOUNTER — OFFICE VISIT (OUTPATIENT)
Dept: FAMILY MEDICINE CLINIC | Facility: CLINIC | Age: 55
End: 2025-03-05
Payer: COMMERCIAL

## 2025-03-05 VITALS
OXYGEN SATURATION: 99 % | HEART RATE: 78 BPM | DIASTOLIC BLOOD PRESSURE: 62 MMHG | WEIGHT: 163 LBS | SYSTOLIC BLOOD PRESSURE: 118 MMHG | BODY MASS INDEX: 27.83 KG/M2 | TEMPERATURE: 98.7 F | HEIGHT: 64 IN

## 2025-03-05 DIAGNOSIS — M54.2 CERVICALGIA: Primary | ICD-10-CM

## 2025-03-05 PROCEDURE — 99213 OFFICE O/P EST LOW 20 MIN: CPT | Performed by: NURSE PRACTITIONER

## 2025-03-05 RX ORDER — CYCLOBENZAPRINE HCL 10 MG
10 TABLET ORAL 3 TIMES DAILY PRN
Qty: 30 TABLET | Refills: 0 | Status: SHIPPED | OUTPATIENT
Start: 2025-03-05

## 2025-03-05 NOTE — PROGRESS NOTES
Name: Nilam Shukla      : 1970      MRN: [id number]  Encounter Provider: YAZMIN Chawla  Encounter Date: 3/5/2025   Encounter department: [facility]  :  Assessment & Plan  Cervicalgia  Recommend warm compresses carine stretches and xrays  Flexeril up to 3x daily as needed, may cause drowsiness  Check xray  If no improvement will then recommend PT  Orders:    cyclobenzaprine (FLEXERIL) 10 mg tablet; Take 1 tablet (10 mg total) by mouth 3 (three) times a day as needed for muscle spasms    XR spine cervical complete 4 or 5 vw non injury; Future           History of Present Illness     Neck pain feels like a crunching sensation with certain movements  Uses a heat pack and takes advil which does help  Felt mobility was better  Feels it is locking  Not taking advil consistently but when she takes it more regularly it does ease things.  No numbness or tingling in arms or hands  Has had random gripping issues on left hand but was fleeting   No headaches with pain.   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 That movement is okay at work looking from screen to screen but its more larger movements that worsen it    Same pillow but just got home from traveling and sleeping on all sorts of pillows but nothing bothered her on the trip.  Went to visit family in [place].           Review of Systems   Constitutional:  Negative for chills and fever.   HENT: Negative.  Negative for ear pain and sore throat.    Eyes:  Negative for pain and visual disturbance.   Respiratory:  Negative for cough and shortness of breath.    Cardiovascular:  Negative for chest pain and palpitations.   Gastrointestinal:  Negative for abdominal pain, constipation, diarrhea, nausea and vomiting.   Endocrine: Negative.    Genitourinary:  Negative for dysuria and hematuria.   Musculoskeletal:  Positive for neck pain and neck  "stiffness. Negative for arthralgias and back pain.   Skin:  Negative for color change and rash.   Neurological:  Negative for dizziness, seizures, syncope, weakness, numbness and headaches.   Psychiatric/Behavioral:  Negative for sleep disturbance.    All other systems reviewed and are negative.      Objective   /62   Pulse 78   Temp 98.7 °F (37.1 °C) (Tympanic)   Ht 5' 4\" (1.626 m)   Wt 73.9 kg (163 lb)   SpO2 99%   BMI 27.98 kg/m²      Physical Exam  Vitals reviewed.   Constitutional:       General: She is not in acute distress.     Appearance: Normal appearance. She is normal weight.   HENT:      Head: Normocephalic.   Cardiovascular:      Rate and Rhythm: Normal rate and regular rhythm.      Heart sounds: Normal heart sounds. No murmur heard.  Pulmonary:      Breath sounds: Normal breath sounds. No wheezing or rhonchi.   Abdominal:      Palpations: Abdomen is soft.   Musculoskeletal:      Cervical back: Spasms and tenderness present. No bony tenderness. Pain with movement present. Decreased range of motion.   Lymphadenopathy:      Cervical: No cervical adenopathy.   Skin:     Findings: No rash.   Neurological:      Mental Status: She is alert and oriented to person, place, and time.   Psychiatric:         Mood and Affect: Mood normal.         Behavior: Behavior normal.         "

## 2025-03-09 ENCOUNTER — HOSPITAL ENCOUNTER (OUTPATIENT)
Dept: RADIOLOGY | Facility: HOSPITAL | Age: 55
Discharge: HOME | End: 2025-03-09
Payer: COMMERCIAL

## 2025-03-09 DIAGNOSIS — M54.2 CERVICALGIA: ICD-10-CM

## 2025-03-09 PROCEDURE — 72050 X-RAY EXAM NECK SPINE 4/5VWS: CPT

## 2025-03-11 ENCOUNTER — RESULTS FOLLOW-UP (OUTPATIENT)
Dept: FAMILY MEDICINE CLINIC | Facility: CLINIC | Age: 55
End: 2025-03-11

## 2025-03-11 NOTE — TELEPHONE ENCOUNTER
----- Message from YAZMIN Rodriguez sent at 3/11/2025 10:22 AM EDT -----  Hi Nilam, Your neck xray does shows narrowing between the vertebrae that can compress the cords at 2 different levels C3-4 abd C6-7. You also have noted some bony growths C5-7 which happen from wear and tear these are likely what is also causing you to have neck pain, stiffness, cramping, difficulty moving your neck. For all of the above it would be recommended to do physical therapy, anti-inflammatories, muscle relaxer (like flexeril) and if no improvement to see pain management. If agreeable I will place order for PT

## 2025-03-11 NOTE — RESULT ENCOUNTER NOTE
Hi Nilam, Your neck xray does shows narrowing between the vertebrae that can compress the cords at 2 different levels C3-4 abd C6-7. You also have noted some bony growths C5-7 which happen from wear and tear these are likely what is also causing you to have neck pain, stiffness, cramping, difficulty moving your neck. For all of the above it would be recommended to do physical therapy, anti-inflammatories, muscle relaxer (like flexeril) and if no improvement to see pain management. If agreeable I will place order for PT

## 2025-03-11 NOTE — TELEPHONE ENCOUNTER
I spoke to Pt gave message from provider Pt wants to know if she could follow up with Ortho since family hx of mother having a similar issue at same age and it became debilitating ... also hospital associated PT is double the price for her, so she will let us know where she can make an appt ... She says the Flexeril doesn't seem to do anything.   Advil does help especially for end of day but worried about long time use.

## 2025-03-12 DIAGNOSIS — M43.12 ANTEROLISTHESIS OF CERVICAL SPINE: ICD-10-CM

## 2025-03-12 DIAGNOSIS — M47.812 FACET ARTHROPATHY, CERVICAL: ICD-10-CM

## 2025-03-12 DIAGNOSIS — M54.2 CERVICALGIA: Primary | ICD-10-CM

## 2025-03-25 ENCOUNTER — CONSULT (OUTPATIENT)
Dept: PAIN MEDICINE | Facility: CLINIC | Age: 55
End: 2025-03-25
Payer: COMMERCIAL

## 2025-03-25 VITALS
HEART RATE: 68 BPM | WEIGHT: 164 LBS | HEIGHT: 64 IN | DIASTOLIC BLOOD PRESSURE: 59 MMHG | BODY MASS INDEX: 28 KG/M2 | SYSTOLIC BLOOD PRESSURE: 104 MMHG

## 2025-03-25 DIAGNOSIS — M54.2 CERVICALGIA: ICD-10-CM

## 2025-03-25 DIAGNOSIS — M47.812 CERVICAL FACET JOINT SYNDROME: ICD-10-CM

## 2025-03-25 DIAGNOSIS — M43.12 ANTEROLISTHESIS OF CERVICAL SPINE: ICD-10-CM

## 2025-03-25 DIAGNOSIS — M54.2 NECK PAIN: Primary | ICD-10-CM

## 2025-03-25 DIAGNOSIS — M47.812 FACET ARTHROPATHY, CERVICAL: ICD-10-CM

## 2025-03-25 PROCEDURE — 99204 OFFICE O/P NEW MOD 45 MIN: CPT | Performed by: PHYSICAL MEDICINE & REHABILITATION

## 2025-03-25 RX ORDER — CELECOXIB 100 MG/1
100 CAPSULE ORAL 2 TIMES DAILY PRN
Qty: 600 CAPSULE | Refills: 1 | Status: SHIPPED | OUTPATIENT
Start: 2025-03-25 | End: 2025-03-26

## 2025-03-25 NOTE — PROGRESS NOTES
Assessment  1. Neck pain    2. Cervicalgia    3. Anterolisthesis of cervical spine    4. Facet arthropathy, cervical    5. Cervical facet joint syndrome        Plan  Ms. Shukla is a pleasant 54-year-old female significant past medical history of hyperlipidemia presents to [facility] for initial evaluation regarding isolated right and left-sided neck pain that is impacted her quality of life and activities of daily living.  She reports enjoying hiking and backpacking and having difficulties at times due to the limited range of motion and pain in her neck.  She just darted physical therapy last week which she does report provide some relief.  During today's evaluation she is demonstrating clinical and diagnostic evidence of cervical facet mediated axial mediated neck pain.  Extensive conversation regarding next steps including interventional approaches were discussed and she wishes to proceed    The patient has been experiencing moderate to severe axial spine pain that is causing functional deficit.  The pain has been present for at least 3 months and is not improving with conservative care.  Currently the patient is not experiencing any radicular features nor neurogenic claudication.  Nonfacet pathology has been ruled out on clinical evaluation.    We will schedule the patient for right-sided C4, C5, C6 medial branch nerve blocks with intention of moving forward towards radiofrequency ablation if there is an appropriate diagnostic response.  The initial blocks will be performed with 0.25% bupivacaine and if an appropriate response is obtained upon review of the patient's pain diary, a confirmatory block will be scheduled with 0.75% bupivacaine.     In the office today, we reviewed the nature of facet joint pathology in depth using a spine model. We discussed the approach we would use for the injections and provided literature for home review. The patient understands the risks associated with  the procedure including bleeding, infection, tissue injury, and allergic reaction and provided verbal informed consent in the office today.    After completing the right side we will then proceed with the left side.    My impressions and treatment recommendations were discussed in detail with the patient who verbalized understanding and had no further questions.  Discharge instructions were provided. I personally saw and examined the patient and I agree with the above discussed plan of care.    Orders Placed This Encounter   Procedures    FL spine and pain procedure     Standing Status:   Future     Expected Date:   3/25/2025     Expiration Date:   3/25/2029     Reason for Exam::   Right sided C4, C5, C6 MBB #1     Is the patient pregnant?:   No     Anticoagulant hold needed?:   No     New Medications Ordered This Visit   Medications    celecoxib (CeleBREX) 100 mg capsule     Sig: Take 1 capsule (100 mg total) by mouth 2 (two) times a day as needed for mild pain     Dispense:  600 capsule     Refill:  1       History of Present Illness    Nilam Shukla is a 54 y.o. female presents to [facility] for initial evaluation regarding right and left sided neck pain.  Denies any significant inciting event or recent trauma.  Today reports moderate pain rated 2-6 out of 10 and minimally interfering with daily tibias.  Pain is described as a nearly constant sharp, throbbing, dull aching pain.  Denies any significant upper extremity weakness or dropping objects.  Does not use any durable medical recurrent fibrillation.  Symptoms are worse with sitting, exercise.  Reports some relief with Advil in the past.  Physical therapy just started last week.  Presents today for initial valuation.    I have personally reviewed and/or updated the patient's past medical history, past surgical history, family history, social history, current medications, allergies, and vital signs today.     Review of Systems    Constitutional:  Negative for fever and unexpected weight change.   HENT:  Negative for trouble swallowing.    Eyes:  Negative for visual disturbance.   Respiratory:  Negative for shortness of breath and wheezing.    Cardiovascular:  Negative for chest pain and palpitations.   Gastrointestinal:  Negative for constipation, diarrhea, nausea and vomiting.   Endocrine: Negative for cold intolerance, heat intolerance and polydipsia.   Genitourinary:  Negative for difficulty urinating and frequency.   Musculoskeletal:  Positive for neck pain. Negative for arthralgias, gait problem, joint swelling and myalgias.   Skin:  Negative for rash.   Neurological:  Negative for dizziness, seizures, syncope, weakness and headaches.   Hematological:  Does not bruise/bleed easily.   Psychiatric/Behavioral:  Negative for dysphoric mood.    All other systems reviewed and are negative.      Patient Active Problem List   Diagnosis    Menopausal syndrome (hot flashes)    Other hyperlipidemia    Class 1 obesity due to excess calories without serious comorbidity with body mass index (BMI) of 31.0 to 31.9 in adult    Contact dermatitis    Travel advice encounter       Past Medical History:   Diagnosis Date    BRCA1 negative     BRCA2 negative     Bunion     Outside of foot    Dizziness     Ear problems     Migraine     Nasal congestion     Nonvenomous insect bite 05/29/2024    Tinnitus        Past Surgical History:   Procedure Laterality Date    ENDOMETRIAL ABLATION  2001    LAPAROSCOPIC OVARIAN CYSTECTOMY Left 2001    LIPOMA RESECTION Left 2003    OOPHORECTOMY Bilateral 2019    TUBAL LIGATION  2019    UMBILICAL HERNIA REPAIR  2003    WISDOM TOOTH EXTRACTION  1988       Family History   Problem Relation Age of Onset    Ovarian cancer Mother 65    Glaucoma Mother     Cancer Mother         Ovarian, lung    Skin cancer Mother         80's-multiple skin ca    Lung cancer Mother         Late 80's    Arthritis Mother     Coronary artery disease  "Father     Diabetes Father     No Known Problems Daughter     No Known Problems Maternal Grandmother     Prostate cancer Maternal Grandfather     No Known Problems Paternal Grandmother     No Known Problems Paternal Grandfather     Diabetes Brother     Diabetes Brother     Cirrhosis Brother     Alcohol abuse Brother     No Known Problems Maternal Aunt     No Known Problems Maternal Aunt     No Known Problems Paternal Aunt     No Known Problems Paternal Aunt     No Known Problems Paternal Aunt     Colon cancer Neg Hx     Rectal cancer Neg Hx     Colon polyps Neg Hx        Social History     Occupational History    Not on file   Tobacco Use    Smoking status: Never     Passive exposure: Never    Smokeless tobacco: Never   Vaping Use    Vaping status: Never Used   Substance and Sexual Activity    Alcohol use: Yes    Drug use: Never    Sexual activity: Yes     Partners: Male       Current Outpatient Medications on File Prior to Visit   Medication Sig    cyclobenzaprine (FLEXERIL) 10 mg tablet Take 1 tablet (10 mg total) by mouth 3 (three) times a day as needed for muscle spasms    famotidine (PEPCID) 20 mg tablet Take 20 mg by mouth 2 (two) times a day    SEMAGLUTIDE, 1 MG/DOSE, SC     Syringe/Needle, Disp, (Syringe Luer Slip) 27G X 1/2\" 1 ML MISC Use once a week For compounded semaglutide injection weekly    gabapentin (Neurontin) 100 mg capsule Take 2 capsules (200 mg total) by mouth 2 (two) times a day AND 3 capsules (300 mg total) daily at bedtime. For nerve pain.    valACYclovir (VALTREX) 1,000 mg tablet Take 1 tablet (1,000 mg total) by mouth 3 (three) times a day for 7 days     No current facility-administered medications on file prior to visit.       Allergies   Allergen Reactions    Clindamycin Rash    Doxycycline GI Intolerance     DOXYCYCLINE MONOHYDRATE TABLET she cannot tolerate       Physical Exam    /59 (BP Location: Right arm, Patient Position: Sitting, Cuff Size: Standard)   Pulse 68   Ht 5' 4\" " (1.626 m)   Wt 74.4 kg (164 lb)   BMI 28.15 kg/m²     Constitutional: normal, well developed, well nourished, alert, in no distress and non-toxic and no overt pain behavior.  Eyes: anicteric  HEENT: grossly intact  Neck: supple, symmetric, trachea midline and no masses   Pulmonary:even and unlabored  Cardiovascular:No edema or pitting edema present  Skin:Normal without rashes or lesions and well hydrated  Psychiatric:Mood and affect appropriate  Neurologic:Cranial Nerves II-XII grossly intact  Musculoskeletal:normal gait, tenderness to palpation bilateral cervical paraspinals, decreased active and passive range of motion with cervical flexion, extension, bilateral sidebending and rotation limited by pain, MMT 5 out of 5 bilateral upper extremities, sensation gross intact to light touch, DTRs within normal limits,  Positive pain to palpation bilateral cervical facets with axial loading    Imaging

## 2025-03-26 ENCOUNTER — TELEPHONE (OUTPATIENT)
Dept: PAIN MEDICINE | Facility: CLINIC | Age: 55
End: 2025-03-26

## 2025-03-26 DIAGNOSIS — M43.12 ANTEROLISTHESIS OF CERVICAL SPINE: ICD-10-CM

## 2025-03-26 DIAGNOSIS — M47.812 FACET ARTHROPATHY, CERVICAL: ICD-10-CM

## 2025-03-26 DIAGNOSIS — M47.812 CERVICAL FACET JOINT SYNDROME: ICD-10-CM

## 2025-03-26 DIAGNOSIS — M54.2 NECK PAIN: ICD-10-CM

## 2025-03-26 DIAGNOSIS — M54.2 CERVICALGIA: ICD-10-CM

## 2025-03-26 RX ORDER — MELOXICAM 7.5 MG/1
7.5 TABLET ORAL DAILY
Qty: 30 TABLET | Refills: 1 | Status: SHIPPED | OUTPATIENT
Start: 2025-03-26

## 2025-03-26 NOTE — TELEPHONE ENCOUNTER
Caller: Nilam    Doctor: Dr. Heart    Reason for call: patient has tried the Celebrex yesterday and woke up to a Rash please advise     Call back#: [phone]

## 2025-03-27 NOTE — TELEPHONE ENCOUNTER
S/w the patient to inquire. She reviewed her reaction to the Celebrex. She stated she took Benedryl every 8 hours yesterday and she had to stop working early because she was so tired. She slept and was groggy and ended up sleeping for about 10 hours yesterday. She stated the rash is still there and she still feels groggy. Reviewed with her that she may be sensitive to the benedryl and maybe she should only take at HS. It could feel like a hangover type of sensation. Reviewed that she could take an allergy medication like a claritan or zyrtec to help with the rash. Inquired to see if she has allergies. She stated she does have zyrtec. Inquired about changes to recent detergent, soap or food. She denied. Encouraged her to use ice to the area for increased comfort and or she could use a topical anti itch cream. Reviewed with her to give it a couple more days to resolve since she just stopped the medication. Encouraged the patient to CB if any ongoing issues. She appreciated the CB. KW to advise otherwise.

## 2025-03-27 NOTE — TELEPHONE ENCOUNTER
Caller: pt    Doctor: Dr. wall    Reason for call: pt is still having issues with sleepiness and the allergic reaction. She wants to know when this will go away?    Call back#: [phone]

## 2025-06-03 ENCOUNTER — TELEPHONE (OUTPATIENT)
Dept: FAMILY MEDICINE CLINIC | Facility: CLINIC | Age: 55
End: 2025-06-03

## 2025-06-03 DIAGNOSIS — Z13.0 SCREENING FOR DEFICIENCY ANEMIA: ICD-10-CM

## 2025-06-03 DIAGNOSIS — E78.49 OTHER HYPERLIPIDEMIA: ICD-10-CM

## 2025-06-03 DIAGNOSIS — Z13.1 SCREENING FOR DIABETES MELLITUS: ICD-10-CM

## 2025-06-03 DIAGNOSIS — Z13.29 SCREENING FOR THYROID DISORDER: Primary | ICD-10-CM

## 2025-06-03 NOTE — TELEPHONE ENCOUNTER
Patient called back in regards to switching appointment to virtual today. Patient said that she would not be able to do this advised we can push the appointment to July when she is due for Physical. Patient was okay with this and rescheduled to 7/8/2025. Patient would like to know what blood work Bev would like to have her complete before appointment. Patient does use [facility].

## 2025-07-02 LAB
ALBUMIN SERPL-MCNC: 4.3 G/DL (ref 3.8–4.9)
ALP SERPL-CCNC: 67 IU/L (ref 44–121)
ALT SERPL-CCNC: 16 IU/L (ref 0–32)
AST SERPL-CCNC: 19 IU/L (ref 0–40)
BASOPHILS # BLD AUTO: 0.1 X10E3/UL (ref 0–0.2)
BASOPHILS NFR BLD AUTO: 1 %
BILIRUB SERPL-MCNC: 0.4 MG/DL (ref 0–1.2)
BUN SERPL-MCNC: 18 MG/DL (ref 6–24)
BUN/CREAT SERPL: 21 (ref 9–23)
CALCIUM SERPL-MCNC: 9.5 MG/DL (ref 8.7–10.2)
CHLORIDE SERPL-SCNC: 104 MMOL/L (ref 96–106)
CHOLEST SERPL-MCNC: 215 MG/DL (ref 100–199)
CHOLEST/HDLC SERPL: 4.3 RATIO (ref 0–4.4)
CO2 SERPL-SCNC: 23 MMOL/L (ref 20–29)
CREAT SERPL-MCNC: 0.85 MG/DL (ref 0.57–1)
EGFR: 81 ML/MIN/1.73
EOSINOPHIL # BLD AUTO: 0.2 X10E3/UL (ref 0–0.4)
EOSINOPHIL NFR BLD AUTO: 3 %
ERYTHROCYTE [DISTWIDTH] IN BLOOD BY AUTOMATED COUNT: 12.5 % (ref 11.7–15.4)
GLOBULIN SER-MCNC: 1.9 G/DL (ref 1.5–4.5)
GLUCOSE SERPL-MCNC: 89 MG/DL (ref 70–99)
HCT VFR BLD AUTO: 39.1 % (ref 34–46.6)
HDLC SERPL-MCNC: 50 MG/DL
HGB BLD-MCNC: 13.1 G/DL (ref 11.1–15.9)
IMM GRANULOCYTES # BLD: 0 X10E3/UL (ref 0–0.1)
IMM GRANULOCYTES NFR BLD: 0 %
LDLC SERPL CALC-MCNC: 136 MG/DL (ref 0–99)
LYMPHOCYTES # BLD AUTO: 1.9 X10E3/UL (ref 0.7–3.1)
LYMPHOCYTES NFR BLD AUTO: 37 %
MCH RBC QN AUTO: 30 PG (ref 26.6–33)
MCHC RBC AUTO-ENTMCNC: 33.5 G/DL (ref 31.5–35.7)
MCV RBC AUTO: 90 FL (ref 79–97)
MONOCYTES # BLD AUTO: 0.4 X10E3/UL (ref 0.1–0.9)
MONOCYTES NFR BLD AUTO: 8 %
NEUTROPHILS # BLD AUTO: 2.5 X10E3/UL (ref 1.4–7)
NEUTROPHILS NFR BLD AUTO: 51 %
PLATELET # BLD AUTO: 243 X10E3/UL (ref 150–450)
POTASSIUM SERPL-SCNC: 4.2 MMOL/L (ref 3.5–5.2)
PROT SERPL-MCNC: 6.2 G/DL (ref 6–8.5)
RBC # BLD AUTO: 4.36 X10E6/UL (ref 3.77–5.28)
SL AMB VLDL CHOLESTEROL CALC: 29 MG/DL (ref 5–40)
SODIUM SERPL-SCNC: 140 MMOL/L (ref 134–144)
TRIGL SERPL-MCNC: 162 MG/DL (ref 0–149)
TSH SERPL DL<=0.005 MIU/L-ACNC: 1.94 UIU/ML (ref 0.45–4.5)
WBC # BLD AUTO: 5 X10E3/UL (ref 3.4–10.8)

## 2025-07-08 ENCOUNTER — OFFICE VISIT (OUTPATIENT)
Dept: FAMILY MEDICINE CLINIC | Facility: CLINIC | Age: 55
End: 2025-07-08
Payer: COMMERCIAL

## 2025-07-08 VITALS
BODY MASS INDEX: 28.65 KG/M2 | DIASTOLIC BLOOD PRESSURE: 64 MMHG | OXYGEN SATURATION: 97 % | HEIGHT: 64 IN | WEIGHT: 167.8 LBS | TEMPERATURE: 97 F | HEART RATE: 80 BPM | SYSTOLIC BLOOD PRESSURE: 106 MMHG

## 2025-07-08 DIAGNOSIS — Z00.00 ANNUAL PHYSICAL EXAM: Primary | ICD-10-CM

## 2025-07-08 DIAGNOSIS — E66.3 OVERWEIGHT WITH BODY MASS INDEX (BMI) OF 28 TO 28.9 IN ADULT: ICD-10-CM

## 2025-07-08 DIAGNOSIS — E78.49 OTHER HYPERLIPIDEMIA: ICD-10-CM

## 2025-07-08 DIAGNOSIS — Z12.31 ENCOUNTER FOR SCREENING MAMMOGRAM FOR BREAST CANCER: ICD-10-CM

## 2025-07-08 DIAGNOSIS — Z23 ENCOUNTER FOR IMMUNIZATION: ICD-10-CM

## 2025-07-08 PROBLEM — E66.811 CLASS 1 OBESITY DUE TO EXCESS CALORIES WITHOUT SERIOUS COMORBIDITY WITH BODY MASS INDEX (BMI) OF 31.0 TO 31.9 IN ADULT: Status: RESOLVED | Noted: 2024-04-25 | Resolved: 2025-07-08

## 2025-07-08 PROBLEM — Z71.84 TRAVEL ADVICE ENCOUNTER: Status: RESOLVED | Noted: 2024-08-23 | Resolved: 2025-07-08

## 2025-07-08 PROBLEM — E66.09 CLASS 1 OBESITY DUE TO EXCESS CALORIES WITHOUT SERIOUS COMORBIDITY WITH BODY MASS INDEX (BMI) OF 31.0 TO 31.9 IN ADULT: Status: RESOLVED | Noted: 2024-04-25 | Resolved: 2025-07-08

## 2025-07-08 PROBLEM — L25.9 CONTACT DERMATITIS: Status: RESOLVED | Noted: 2024-08-23 | Resolved: 2025-07-08

## 2025-07-08 PROCEDURE — 90471 IMMUNIZATION ADMIN: CPT

## 2025-07-08 PROCEDURE — 99396 PREV VISIT EST AGE 40-64: CPT | Performed by: NURSE PRACTITIONER

## 2025-07-08 PROCEDURE — 90750 HZV VACC RECOMBINANT IM: CPT

## 2025-07-08 NOTE — PROGRESS NOTES
Adult Annual Physical  Name: Nilam Shukla      : 1970      MRN: [id number]  Encounter Provider: YAZMIN Chawla  Encounter Date: 2025   Encounter department: [facility]    :  Assessment & Plan  Annual physical exam  Up to date on yearly screenings  Schedule your mammogram  Gave shingles vaccine today, given second dose in 2 months       Encounter for screening mammogram for breast cancer    Orders:    Mammo screening bilateral w 3d and cad; Future    Overweight with body mass index (BMI) of 28 to 28.9 in adult  Improved with semaglutide compounded formulation  Uses sparingly         Other hyperlipidemia  Stable, continue to monitor yearly and continue dietary modifiactions         Encounter for immunization    Orders:    Zoster Vaccine Recombinant IM        Preventive Screenings:  - Diabetes Screening: screening up-to-date  - Cholesterol Screening: screening not indicated and has hyperlipidemia   - Cervical cancer screening: screening up-to-date   - Breast cancer screening: screening up-to-date   - Colon cancer screening: screening up-to-date   - Lung cancer screening: screening not indicated   - Osteoporosis screening: screening up-to-date     Immunizations:  - Immunizations due: Zoster (Shingrix)  - Risks/benefits immunizations discussed    - Immunizations given per orders      Counseling/Anticipatory Guidance:  - Alcohol: discussed moderation in alcohol intake and recommendations for healthy alcohol use.   - Dental health: discussed importance of regular tooth brushing, flossing, and dental visits.   - Sexual health: discussed sexually transmitted diseases, partner selection, use of condoms, avoidance of unintended pregnancy, and contraceptive alternatives.   - Diet: discussed recommendations for a healthy/well-balanced diet.   - Exercise: the importance of regular exercise/physical activity was discussed. Recommend exercise 3-5 times per week for at least 30 minutes.   -  Injury prevention: discussed safety/seat belts, safety helmets, smoke detectors, carbon monoxide detectors, and smoking near bedding or upholstery.       Depression Screening and Follow-up Plan: Patient was screened for depression during today's encounter. They screened negative with a PHQ-2 score of 0.          History of Present Illness     Adult Annual Physical:  Patient presents for annual physical. Reviewed labs 7/2/25   HDL 50    CBC, CMP, TSH were all normal     Mammogram 6/2024 due for repeat  DXA 7/2024- normal repeat 2 years    She still has supply of her compounded semaglutide  She will use sparingly, she 0.25ml.     Diet and Physical Activity:  - Diet/Nutrition: no special diet, well balanced diet, portion control and consuming 3-5 servings of fruits/vegetables daily.  - Exercise: walking and 3-4 times a week on average. remaining really active on property but not as intense as her training was this time last year    Depression Screening:  - PHQ-2 Score: 0    General Health:  - Sleep: sleeps well and 7-8 hours of sleep on average.  - Hearing: tinnitus. A lot of familial hearing issues/loss  - Vision: no vision problems, most recent eye exam < 1 year ago and wears glasses and contacts.  - Dental: regular dental visits and brushes teeth once daily.    /GYN Health:  - Follows with GYN: yes.   - Menopause: postmenopausal.   - History of STDs: no    Advanced Care Planning:  - Has an advanced directive?: no    - Has a durable medical POA?: no              Review of Systems   Constitutional:  Negative for chills and fever.   HENT:  Negative for ear pain and sore throat.    Eyes:  Negative for pain and visual disturbance.   Respiratory:  Negative for cough and shortness of breath.    Cardiovascular:  Negative for chest pain and palpitations.   Gastrointestinal:  Negative for abdominal pain and vomiting.   Genitourinary:  Negative for dysuria and hematuria.   Musculoskeletal:  Positive for  "neck pain. Negative for arthralgias and back pain. Myalgias: intermittently flares.  Skin:  Negative for color change and rash.   Neurological:  Negative for seizures and syncope.   All other systems reviewed and are negative.        Objective   /64   Pulse 80   Temp (!) 97 °F (36.1 °C) (Tympanic)   Ht 5' 4\" (1.626 m)   Wt 76.1 kg (167 lb 12.8 oz)   SpO2 97%   BMI 28.80 kg/m²     Physical Exam  Vitals and nursing note reviewed.   Constitutional:       General: She is not in acute distress.     Appearance: Normal appearance. She is well-developed.   HENT:      Head: Normocephalic and atraumatic.      Right Ear: Tympanic membrane, ear canal and external ear normal.      Left Ear: Tympanic membrane, ear canal and external ear normal.      Nose: Nose normal.      Mouth/Throat:      Mouth: Mucous membranes are moist.      Pharynx: Oropharynx is clear.     Eyes:      Conjunctiva/sclera: Conjunctivae normal.      Pupils: Pupils are equal, round, and reactive to light.     Neck:      Thyroid: No thyromegaly or thyroid tenderness.     Cardiovascular:      Rate and Rhythm: Normal rate and regular rhythm.      Pulses:           Radial pulses are 2+ on the right side and 2+ on the left side.      Heart sounds: Normal heart sounds. No murmur heard.  Pulmonary:      Effort: Pulmonary effort is normal. No respiratory distress.      Breath sounds: Normal breath sounds.   Abdominal:      General: Bowel sounds are normal.      Palpations: Abdomen is soft.      Tenderness: There is no abdominal tenderness.     Musculoskeletal:      Cervical back: Neck supple.      Right lower leg: No edema.      Left lower leg: No edema.   Lymphadenopathy:      Cervical: No cervical adenopathy.     Skin:     General: Skin is warm and dry.     Neurological:      Mental Status: She is alert and oriented to person, place, and time.     Psychiatric:         Mood and Affect: Mood normal.         Behavior: Behavior normal.         "